# Patient Record
Sex: FEMALE | Race: OTHER | NOT HISPANIC OR LATINO
[De-identification: names, ages, dates, MRNs, and addresses within clinical notes are randomized per-mention and may not be internally consistent; named-entity substitution may affect disease eponyms.]

---

## 2019-06-26 ENCOUNTER — APPOINTMENT (OUTPATIENT)
Dept: ENDOCRINOLOGY | Facility: CLINIC | Age: 19
End: 2019-06-26
Payer: COMMERCIAL

## 2019-06-26 VITALS
HEIGHT: 65 IN | SYSTOLIC BLOOD PRESSURE: 107 MMHG | WEIGHT: 148 LBS | DIASTOLIC BLOOD PRESSURE: 67 MMHG | HEART RATE: 57 BPM | BODY MASS INDEX: 24.66 KG/M2

## 2019-06-26 PROBLEM — Z00.00 ENCOUNTER FOR PREVENTIVE HEALTH EXAMINATION: Status: ACTIVE | Noted: 2019-06-26

## 2019-06-26 PROCEDURE — 99205 OFFICE O/P NEW HI 60 MIN: CPT

## 2019-06-28 NOTE — ADDENDUM
[FreeTextEntry1] : I, Alonzodevaughn Cedeño, acted solely as a scribe for Dr. Scar Pinto on this date. 06/26/2019.\par

## 2019-06-28 NOTE — HISTORY OF PRESENT ILLNESS
[FreeTextEntry1] : 20 y/o F pt, /67, BMI 24.63, with Hx thyroid nodule (dx in ~2018), referred by PCP: Dr. Jaylyn Severino, presents today to establish endocrine care with me.\par No FHx of thyroid disease or DM.\par Pt denies hx of childhood neck/head radiation exposure.\par SHx: former smoker (quit on 6/23/19)\par LNMP: 6/1/19, pt has not plans for pregnancies.\par \par 5/22/19 s. creat 0.7, TSH 1.1, total T4 6.4, \par 5/22/19 Thyroid US: L lobe solid heterogeneous nodule at the left mid and lower pole with punctate calcification measuring 3.7 x 1.7                                   x 2.6cm. \par                                  Lymph node: L cervical lymph node 1.3 x 1.5 x 1.7 cm\par                                  R cervical lymph node 1.8 x 1.3 x 1.0cm\par 6/25/19 FNA Biopsy at [facility]: [place] VI, papillary thyroid CA\par \par Pt says in ~2018 she noticed thyroid enlargement and went to see her doctor, who recommended a thyroid biopsy (done on 6/25/19). Pt states the biopsy reported she had papillary thyroid CA.\par Today pt presents feeling well, with no major physical complaints.

## 2019-06-28 NOTE — ASSESSMENT
[FreeTextEntry1] : 1. Self diagnosed thyroid nodules ~2 yrs ago, with no childhood Hx of head/neck radiation exposure and no FHx of thyroid CA:\par 5/22/19, thyroid weakness: Right low normal\par Left lobe a3.7 x 1.7 x 2.6 cm solid mass with calcification\par FNA biopsy done in 5/2019 reports [place] VI. Interpretation of FNA biopsy, along with the referral for ENT evaluation and treatment explained to pt.  \par \par Return in 6 weeks.

## 2019-06-28 NOTE — PHYSICAL EXAM
[Alert] : alert [Normal Sclera/Conjunctiva] : normal sclera/conjunctiva [Normal Outer Ear/Nose] : the ears and nose were normal in appearance [No Respiratory Distress] : no respiratory distress [Clear to Auscultation] : lungs were clear to auscultation bilaterally [Normal Rate] : heart rate was normal  [Normal S1, S2] : normal S1 and S2 [Normal Bowel Sounds] : normal bowel sounds [Spine Straight] : spine straight [Normal Gait] : normal gait [Oriented x3] : oriented to person, place, and time [de-identified] : L thyroid mass indurated CC ~4cm lengthwise and ~2.5cm widthwise, nodularities in the R lobe [de-identified] : no hand tremors

## 2019-06-28 NOTE — END OF VISIT
[>50% of Time Spent on Counseling for ____] : Greater than 50% of the encounter time was spent on counseling for [unfilled] [FreeTextEntry3] : All medical record entries made by the Scribe were at my, Dr. Scar Pinto, direction and personally dictated by me on 06/26/2019. I have reviewed the chart and agree that the record accurately reflects my personal performance of the history, physical exam, assessment and plan. I have also personally directed, reviewed and agreed with the chart.\par  [Time Spent: ___ minutes] : I have spent [unfilled] minutes of face to face time with the patient

## 2019-06-28 NOTE — REASON FOR VISIT
[Initial Eval - Existing Diagnosis] : an initial evaluation of an existing diagnosis [FreeTextEntry1] : thyroid nodule

## 2019-07-08 ENCOUNTER — APPOINTMENT (OUTPATIENT)
Dept: OTOLARYNGOLOGY | Facility: CLINIC | Age: 19
End: 2019-07-08

## 2019-08-07 ENCOUNTER — APPOINTMENT (OUTPATIENT)
Dept: ENDOCRINOLOGY | Facility: CLINIC | Age: 19
End: 2019-08-07

## 2019-09-17 ENCOUNTER — APPOINTMENT (OUTPATIENT)
Dept: OTOLARYNGOLOGY | Facility: CLINIC | Age: 19
End: 2019-09-17
Payer: MEDICAID

## 2019-09-17 VITALS
SYSTOLIC BLOOD PRESSURE: 115 MMHG | TEMPERATURE: 98.3 F | BODY MASS INDEX: 24.66 KG/M2 | HEIGHT: 65 IN | HEART RATE: 58 BPM | WEIGHT: 148 LBS | OXYGEN SATURATION: 98 % | DIASTOLIC BLOOD PRESSURE: 76 MMHG

## 2019-09-17 DIAGNOSIS — Z78.9 OTHER SPECIFIED HEALTH STATUS: ICD-10-CM

## 2019-09-17 DIAGNOSIS — Z80.9 FAMILY HISTORY OF MALIGNANT NEOPLASM, UNSPECIFIED: ICD-10-CM

## 2019-09-17 PROCEDURE — 99204 OFFICE O/P NEW MOD 45 MIN: CPT | Mod: 25

## 2019-09-17 PROCEDURE — 31575 DIAGNOSTIC LARYNGOSCOPY: CPT

## 2019-09-17 PROCEDURE — 99203 OFFICE O/P NEW LOW 30 MIN: CPT

## 2019-09-17 NOTE — ASSESSMENT
[FreeTextEntry1] : Discussed a thyroidectomy & she would like to proceed. Will perform a tonsil biopsy first; RTC tomorrow for procedure appt.

## 2019-09-17 NOTE — HISTORY OF PRESENT ILLNESS
[de-identified] : 2 years ago found a L thyroid nodule which eventually resulted in a sono revealing a 3.7 cm; an FNA 7/19 BC VI for PTC. Has had some change in the tone of her voice since 4/19 but no outright breathiness. She denies any dysphagia or other related complaints. Denies a FHx of thyroid issues or ionizing radiation exp. \par Secondly noticed that her L tonsil is larger than her R earlier this year; not sure if this is progressive.

## 2019-09-17 NOTE — PHYSICAL EXAM
[de-identified] : large, firm L thyroid nodule [de-identified] : 3+ R tonsil, 1+ L tonsil [Laryngoscopy Performed] : laryngoscopy was performed, see procedure section for findings [Normal] : orientation to person, place, and time: normal

## 2019-09-17 NOTE — CONSULT LETTER
[Dear  ___] : Dear  [unfilled], [Consult Letter:] : I had the pleasure of evaluating your patient, [unfilled]. [Consult Closing:] : Thank you very much for allowing me to participate in the care of this patient.  If you have any questions, please do not hesitate to contact me. [Please see my note below.] : Please see my note below. [Sincerely,] : Sincerely, [DrBethany  ___] : Dr. FONG [FreeTextEntry3] : WILNER Lopez Jr, MD, FAAOHNS\par Otolaryngologist\par [facility]

## 2019-09-17 NOTE — DATA REVIEWED
[de-identified] : FNA 6/19: BC VI, PTC [de-identified] : 5/19: 3.7 x 2.1 x 2.6 cm L nodule w/ microCa'ns; L cervical node measuring 1.3 x 0.5 x 0.7 cm (no level given) & R cervical node 1.8 x 0.3 x 1.0 cm (no level given)

## 2019-09-18 ENCOUNTER — APPOINTMENT (OUTPATIENT)
Dept: OTOLARYNGOLOGY | Facility: CLINIC | Age: 19
End: 2019-09-18
Payer: MEDICAID

## 2019-09-18 ENCOUNTER — LABORATORY RESULT (OUTPATIENT)
Age: 19
End: 2019-09-18

## 2019-09-18 VITALS
BODY MASS INDEX: 24.66 KG/M2 | HEART RATE: 57 BPM | TEMPERATURE: 98.6 F | HEIGHT: 65 IN | SYSTOLIC BLOOD PRESSURE: 129 MMHG | OXYGEN SATURATION: 100 % | WEIGHT: 148 LBS | DIASTOLIC BLOOD PRESSURE: 64 MMHG

## 2019-09-18 PROCEDURE — 42800 BIOPSY OF THROAT: CPT

## 2019-09-18 RX ORDER — LORATADINE 10 MG/1
10 TABLET ORAL
Qty: 30 | Refills: 0 | Status: DISCONTINUED | COMMUNITY
Start: 2019-09-16

## 2019-09-18 RX ORDER — DESOGESTREL AND ETHINYL ESTRADIOL 0.15-0.03
0.15-3 KIT ORAL
Qty: 28 | Refills: 0 | Status: DISCONTINUED | COMMUNITY
Start: 2019-09-16

## 2019-09-19 NOTE — PROCEDURE
[FreeTextEntry1] : L tonsil biopsy [FreeTextEntry2] : Prominent asymmetry & mildly suspicious appearance [FreeTextEntry3] : After proper informed consent and a time-out, intraoral topical anesthesia was applied.  A small amount of 1% lidocaine with 1:100,000 epinephrine was injected into the tonsil.  Two biopsies were taken with a thru-cut forcep and sent for pathologic analysis.  The patient tolerated the procedure well.

## 2019-10-03 ENCOUNTER — APPOINTMENT (OUTPATIENT)
Dept: OTOLARYNGOLOGY | Facility: CLINIC | Age: 19
End: 2019-10-03
Payer: MEDICAID

## 2019-10-03 VITALS
OXYGEN SATURATION: 99 % | WEIGHT: 148 LBS | HEIGHT: 65 IN | TEMPERATURE: 98.1 F | DIASTOLIC BLOOD PRESSURE: 72 MMHG | SYSTOLIC BLOOD PRESSURE: 121 MMHG | HEART RATE: 74 BPM | BODY MASS INDEX: 24.66 KG/M2

## 2019-10-03 PROCEDURE — 99214 OFFICE O/P EST MOD 30 MIN: CPT

## 2019-10-03 NOTE — DATA REVIEWED
[de-identified] : FNA 6/19: BC VI, PTC\par L tonsil bx: inflammation [de-identified] : 5/19: 3.7 x 2.1 x 2.6 cm L nodule w/ microCa'ns; L cervical node measuring 1.3 x 0.5 x 0.7 cm (no level given) & R cervical node 1.8 x 0.3 x 1.0 cm (no level given)

## 2019-10-03 NOTE — PHYSICAL EXAM
[de-identified] : large, firm L thyroid nodule [de-identified] : 3+ R tonsil, 1+ L tonsil [Normal] : no rashes

## 2019-10-03 NOTE — HISTORY OF PRESENT ILLNESS
[de-identified] : 2 years ago found a L thyroid nodule which eventually resulted in a sono revealing a 3.7 cm; an FNA 7/19 BC VI for PTC. Has had some change in the tone of her voice since 4/19 but no outright breathiness. She denies any dysphagia or other related complaints. Denies a FHx of thyroid issues or ionizing radiation exp. \par Secondly noticed that her L tonsil is larger than her R earlier this year; not sure if this is progressive. Now f/u a bx.

## 2019-10-10 ENCOUNTER — FORM ENCOUNTER (OUTPATIENT)
Age: 19
End: 2019-10-10

## 2019-10-11 ENCOUNTER — APPOINTMENT (OUTPATIENT)
Dept: ULTRASOUND IMAGING | Facility: CLINIC | Age: 19
End: 2019-10-11
Payer: MEDICAID

## 2019-10-11 ENCOUNTER — OUTPATIENT (OUTPATIENT)
Dept: OUTPATIENT SERVICES | Facility: HOSPITAL | Age: 19
LOS: 1 days | End: 2019-10-11

## 2019-10-11 PROCEDURE — 76536 US EXAM OF HEAD AND NECK: CPT | Mod: 26

## 2019-10-21 ENCOUNTER — APPOINTMENT (OUTPATIENT)
Dept: OTOLARYNGOLOGY | Facility: CLINIC | Age: 19
End: 2019-10-21
Payer: MEDICAID

## 2019-10-21 VITALS
OXYGEN SATURATION: 96 % | BODY MASS INDEX: 24.66 KG/M2 | HEART RATE: 72 BPM | DIASTOLIC BLOOD PRESSURE: 73 MMHG | HEIGHT: 65 IN | WEIGHT: 148 LBS | TEMPERATURE: 98.2 F | SYSTOLIC BLOOD PRESSURE: 116 MMHG

## 2019-10-21 DIAGNOSIS — J35.8 OTHER CHRONIC DISEASES OF TONSILS AND ADENOIDS: ICD-10-CM

## 2019-10-21 PROCEDURE — 99215 OFFICE O/P EST HI 40 MIN: CPT

## 2019-10-21 RX ORDER — BACITRACIN 500 [IU]/G
500 OINTMENT TOPICAL
Qty: 1 | Refills: 1 | Status: ACTIVE | COMMUNITY
Start: 2019-10-21 | End: 1900-01-01

## 2019-10-21 RX ORDER — PHENOL 1.4 %
600 AEROSOL, SPRAY (ML) MUCOUS MEMBRANE
Qty: 90 | Refills: 2 | Status: ACTIVE | COMMUNITY
Start: 2019-10-21 | End: 1900-01-01

## 2019-10-21 RX ORDER — HYDROCODONE BITARTRATE AND ACETAMINOPHEN 5; 325 MG/1; MG/1
5-325 TABLET ORAL
Qty: 25 | Refills: 0 | Status: ACTIVE | COMMUNITY
Start: 2019-10-21 | End: 1900-01-01

## 2019-10-21 NOTE — HISTORY OF PRESENT ILLNESS
[de-identified] : 2 years ago found a L thyroid nodule which eventually resulted in a sono revealing a 3.7 cm; an FNA 7/19 BC VI for PTC. Has had some change in the tone of her voice since 4/19 but no outright breathiness. She denies any dysphagia or other related complaints. Denies a FHx of thyroid issues or ionizing radiation exp. Now preop thyroidectomy\par Secondly noticed that her L tonsil is larger than her R earlier this year; not sure if this is progressive, but a bx was negative.

## 2019-10-21 NOTE — DATA REVIEWED
[de-identified] : 5/19: 3.7 x 2.1 x 2.6 cm L nodule w/ microCa'ns; L cervical node measuring 1.3 x 0.5 x 0.7 cm (no level given) & R cervical node 1.8 x 0.3 x 1.0 cm (no level given)\par 10/19 rpt sono: 3.7 cm nodule ess unchanged; nl sized level 2 JOEY nodes [de-identified] : FNA 6/19: BC VI, PTC\par L tonsil bx: inflammation

## 2019-10-21 NOTE — ASSESSMENT
[FreeTextEntry1] : Per the 2015 ASAEL guidelines, discussed the advantages & disadvantages of a hemithyroidectomy for well-diff ca smaller than 4cm, but she strongly desires to have the whole gland removed. \par Discussed the risks and benefits of thyroidectomy and a level VI neck dissection at length, including but not limited to poor scarring, hypoparathyroidism, hoarseness, the need for more surgery, and bleeding or infection. The patient expressed understanding and desired to proceed. An educational perioperative care handout was given.\par  Reference #: [id number]

## 2019-10-21 NOTE — PHYSICAL EXAM
[Normal] : auscultation of heart is normal [de-identified] : large, firm L thyroid nodule, unchanged [de-identified] : 3+ R tonsil, 1+ L tonsil, unchanged

## 2019-10-25 ENCOUNTER — RESULT REVIEW (OUTPATIENT)
Age: 19
End: 2019-10-25

## 2019-10-25 ENCOUNTER — OUTPATIENT (OUTPATIENT)
Dept: INPATIENT UNIT | Facility: HOSPITAL | Age: 19
LOS: 1 days | Discharge: HOME | End: 2019-10-25
Payer: MEDICAID

## 2019-10-25 ENCOUNTER — APPOINTMENT (OUTPATIENT)
Dept: OTOLARYNGOLOGY | Facility: HOSPITAL | Age: 19
End: 2019-10-25

## 2019-10-25 VITALS
HEIGHT: 65 IN | DIASTOLIC BLOOD PRESSURE: 70 MMHG | RESPIRATION RATE: 16 BRPM | SYSTOLIC BLOOD PRESSURE: 107 MMHG | TEMPERATURE: 98 F | HEART RATE: 62 BPM | OXYGEN SATURATION: 100 % | WEIGHT: 145.06 LBS

## 2019-10-25 LAB
CALCIUM SERPL-MCNC: 9.2 MG/DL — SIGNIFICANT CHANGE UP (ref 8.4–10.5)
PTH-INTACT IO % DIF SERPL: 36 PG/ML — SIGNIFICANT CHANGE UP (ref 8.5–72.5)

## 2019-10-25 PROCEDURE — 60252 REMOVAL OF THYROID: CPT | Mod: GC

## 2019-10-25 RX ORDER — LEVOTHYROXINE SODIUM 125 MCG
1 TABLET ORAL
Qty: 30 | Refills: 1
Start: 2019-10-25 | End: 2019-12-23

## 2019-10-25 RX ORDER — HYDROMORPHONE HYDROCHLORIDE 2 MG/ML
0.5 INJECTION INTRAMUSCULAR; INTRAVENOUS; SUBCUTANEOUS
Refills: 0 | Status: DISCONTINUED | OUTPATIENT
Start: 2019-10-25 | End: 2019-10-26

## 2019-10-25 RX ORDER — CALCIUM CARBONATE 500(1250)
1 TABLET ORAL
Qty: 90 | Refills: 0
Start: 2019-10-25 | End: 2019-11-23

## 2019-10-25 RX ORDER — CALCIUM CARBONATE 500(1250)
2 TABLET ORAL EVERY 8 HOURS
Refills: 0 | Status: DISCONTINUED | OUTPATIENT
Start: 2019-10-25 | End: 2019-10-26

## 2019-10-25 RX ORDER — SODIUM CHLORIDE 9 MG/ML
1000 INJECTION, SOLUTION INTRAVENOUS
Refills: 0 | Status: DISCONTINUED | OUTPATIENT
Start: 2019-10-25 | End: 2019-10-26

## 2019-10-25 RX ORDER — BENZOCAINE AND MENTHOL 5; 1 G/100ML; G/100ML
1 LIQUID ORAL EVERY 4 HOURS
Refills: 0 | Status: DISCONTINUED | OUTPATIENT
Start: 2019-10-25 | End: 2019-10-26

## 2019-10-25 RX ORDER — OXYCODONE AND ACETAMINOPHEN 5; 325 MG/1; MG/1
1 TABLET ORAL EVERY 4 HOURS
Refills: 0 | Status: DISCONTINUED | OUTPATIENT
Start: 2019-10-25 | End: 2019-10-26

## 2019-10-25 RX ORDER — ACETAMINOPHEN 500 MG
650 TABLET ORAL EVERY 6 HOURS
Refills: 0 | Status: DISCONTINUED | OUTPATIENT
Start: 2019-10-25 | End: 2019-10-26

## 2019-10-25 RX ORDER — ACETAMINOPHEN 500 MG
650 TABLET ORAL EVERY 6 HOURS
Refills: 0 | Status: DISCONTINUED | OUTPATIENT
Start: 2019-10-25 | End: 2019-10-25

## 2019-10-25 RX ORDER — LEVOTHYROXINE SODIUM 125 MCG
100 TABLET ORAL DAILY
Refills: 0 | Status: DISCONTINUED | OUTPATIENT
Start: 2019-10-25 | End: 2019-10-26

## 2019-10-25 RX ORDER — ONDANSETRON 8 MG/1
4 TABLET, FILM COATED ORAL EVERY 8 HOURS
Refills: 0 | Status: DISCONTINUED | OUTPATIENT
Start: 2019-10-25 | End: 2019-10-26

## 2019-10-25 RX ADMIN — SODIUM CHLORIDE 75 MILLILITER(S): 9 INJECTION, SOLUTION INTRAVENOUS at 21:09

## 2019-10-25 RX ADMIN — Medication 650 MILLIGRAM(S): at 19:51

## 2019-10-25 RX ADMIN — Medication 650 MILLIGRAM(S): at 21:00

## 2019-10-25 RX ADMIN — SODIUM CHLORIDE 75 MILLILITER(S): 9 INJECTION, SOLUTION INTRAVENOUS at 22:07

## 2019-10-25 RX ADMIN — Medication 2 TABLET(S): at 22:06

## 2019-10-25 NOTE — BRIEF OPERATIVE NOTE - OPERATION/FINDINGS
large left thyroid mass approaching midline (~4cm), no gross hipolito disease, RLN identified and preserved bilaterally

## 2019-10-26 ENCOUNTER — TRANSCRIPTION ENCOUNTER (OUTPATIENT)
Age: 19
End: 2019-10-26

## 2019-10-26 VITALS
OXYGEN SATURATION: 100 % | SYSTOLIC BLOOD PRESSURE: 116 MMHG | RESPIRATION RATE: 17 BRPM | DIASTOLIC BLOOD PRESSURE: 71 MMHG | TEMPERATURE: 98 F | HEART RATE: 51 BPM

## 2019-10-26 PROCEDURE — 86901 BLOOD TYPING SEROLOGIC RH(D): CPT

## 2019-10-26 PROCEDURE — 82310 ASSAY OF CALCIUM: CPT

## 2019-10-26 PROCEDURE — 60252 REMOVAL OF THYROID: CPT

## 2019-10-26 PROCEDURE — 83970 ASSAY OF PARATHORMONE: CPT

## 2019-10-26 PROCEDURE — 86850 RBC ANTIBODY SCREEN: CPT

## 2019-10-26 PROCEDURE — 88305 TISSUE EXAM BY PATHOLOGIST: CPT

## 2019-10-26 PROCEDURE — 88307 TISSUE EXAM BY PATHOLOGIST: CPT

## 2019-10-26 PROCEDURE — 86900 BLOOD TYPING SEROLOGIC ABO: CPT

## 2019-10-26 RX ORDER — ACETAMINOPHEN 500 MG
2 TABLET ORAL
Qty: 0 | Refills: 0 | DISCHARGE
Start: 2019-10-26

## 2019-10-26 RX ADMIN — Medication 2 TABLET(S): at 06:03

## 2019-10-26 RX ADMIN — Medication 100 MICROGRAM(S): at 06:03

## 2019-10-26 RX ADMIN — OXYCODONE AND ACETAMINOPHEN 1 TABLET(S): 5; 325 TABLET ORAL at 08:39

## 2019-10-26 RX ADMIN — OXYCODONE AND ACETAMINOPHEN 1 TABLET(S): 5; 325 TABLET ORAL at 09:32

## 2019-10-26 RX ADMIN — Medication 650 MILLIGRAM(S): at 06:04

## 2019-10-26 NOTE — DISCHARGE NOTE PROVIDER - HOSPITAL COURSE
19F s/p completion thyroidectomy for PTC. Recovering well from surgery. Voice strong and clear. Tolerating PO diet. Stable for DC home with outpatient followup.

## 2019-10-26 NOTE — DISCHARGE NOTE PROVIDER - CARE PROVIDER_API CALL
Fredi Lopez)  Otolaryngology  [address] [facility], [address] [place] [address]  Phone: [phone]  Fax: [phone]  Follow Up Time:

## 2019-10-26 NOTE — DISCHARGE NOTE NURSING/CASE MANAGEMENT/SOCIAL WORK - PATIENT PORTAL LINK FT
You can access the FollowMyHealth Patient Portal offered by [facility] by registering at the following website: [email or web address]. By joining [employer]’s FollowMyHealth portal, you will also be able to view your health information using other applications (apps) compatible with our system.

## 2019-10-26 NOTE — DISCHARGE NOTE PROVIDER - NSDCFUADDINST_GEN_ALL_CORE_FT
Keep incision clean and dry. OK to shower, do not scrub neck. Pat neck dry.   No heavy lifting (>5lbs), no straining, no running/exercise or strenuous activity.   Call the office or return to the ED for any bleeding, swelling in the neck , discharge from wound, difficulty breathing, numbess/tingling around mouth or fingers.

## 2019-10-26 NOTE — DISCHARGE NOTE PROVIDER - CARE PROVIDERS DIRECT ADDRESSES
[email or web address]

## 2019-10-26 NOTE — DISCHARGE NOTE PROVIDER - NSDCACTIVITY_GEN_ALL_CORE
Do not drive or operate machinery/No heavy lifting/straining/Stairs allowed/Do not make important decisions/Walking - Indoors allowed/Walking - Outdoors allowed

## 2019-10-26 NOTE — DISCHARGE NOTE PROVIDER - NSDCCPCAREPLAN_GEN_ALL_CORE_FT
PRINCIPAL DISCHARGE DIAGNOSIS  Diagnosis: Papillary thyroid carcinoma  Assessment and Plan of Treatment:

## 2019-10-28 LAB — SURGICAL PATHOLOGY STUDY: SIGNIFICANT CHANGE UP

## 2019-10-31 ENCOUNTER — APPOINTMENT (OUTPATIENT)
Dept: OTOLARYNGOLOGY | Facility: CLINIC | Age: 19
End: 2019-10-31
Payer: MEDICAID

## 2019-10-31 VITALS
SYSTOLIC BLOOD PRESSURE: 148 MMHG | BODY MASS INDEX: 24.66 KG/M2 | OXYGEN SATURATION: 97 % | HEIGHT: 65 IN | HEART RATE: 111 BPM | WEIGHT: 148 LBS | DIASTOLIC BLOOD PRESSURE: 92 MMHG

## 2019-10-31 PROBLEM — C73 MALIGNANT NEOPLASM OF THYROID GLAND: Chronic | Status: ACTIVE | Noted: 2019-10-24

## 2019-10-31 PROCEDURE — 99024 POSTOP FOLLOW-UP VISIT: CPT

## 2019-10-31 NOTE — PHYSICAL EXAM
[Normal] : normal appearance, well groomed, well nourished, and in no acute distress [FreeTextEntry1] : strong voice [de-identified] : well-healing low collar incision

## 2019-10-31 NOTE — ASSESSMENT
[FreeTextEntry1] : Doing well; further care discussed. RTC 1 month & f/u w/ Dr. Pinto to consider WILLIAM. Plan to present at endo conf.

## 2019-10-31 NOTE — HISTORY OF PRESENT ILLNESS
[de-identified] : s/p thyroidectomy & level VI ND 10/25/19; no complaints and doing well. \par path: 3.6 cm PTC, 20% tall cell; confined to gland. 2/3 LNs w/ mets, no extranodal extension

## 2019-11-07 ENCOUNTER — OTHER (OUTPATIENT)
Age: 19
End: 2019-11-07

## 2019-11-25 ENCOUNTER — APPOINTMENT (OUTPATIENT)
Dept: OTOLARYNGOLOGY | Facility: CLINIC | Age: 19
End: 2019-11-25

## 2019-11-27 ENCOUNTER — APPOINTMENT (OUTPATIENT)
Dept: ENDOCRINOLOGY | Facility: CLINIC | Age: 19
End: 2019-11-27
Payer: MEDICAID

## 2019-11-27 ENCOUNTER — LABORATORY RESULT (OUTPATIENT)
Age: 19
End: 2019-11-27

## 2019-11-27 VITALS
HEIGHT: 65 IN | BODY MASS INDEX: 24.83 KG/M2 | HEART RATE: 72 BPM | DIASTOLIC BLOOD PRESSURE: 56 MMHG | SYSTOLIC BLOOD PRESSURE: 99 MMHG | WEIGHT: 149 LBS

## 2019-11-27 PROCEDURE — 99214 OFFICE O/P EST MOD 30 MIN: CPT

## 2019-11-27 NOTE — ASSESSMENT
[FreeTextEntry1] : 20 y/o F with:\par \par 1.  s/p thyroidectomy 2/2 papillary thyroid CA, 3.6 cm, involving left lobe, classic type with tall cell, features (~20%) confined to the thyroid gland.\par No information found on current medication of the pt. Pharmacy was contacted and no rx for Calcitriol and levothyroxine was found.  Had an overview on thyroid papillary CA with pt, and discussed plans for treatments. It is unlikely that she will need WILLIAM ablation.\par Pt will call with her current medications list.\par Ordered TFTs for thyroid and thyroglobulin ab today.\par \par Return in 2 weeks.

## 2019-11-27 NOTE — PHYSICAL EXAM
[Alert] : alert [Normal Sclera/Conjunctiva] : normal sclera/conjunctiva [Normal Outer Ear/Nose] : the ears and nose were normal in appearance [No Respiratory Distress] : no respiratory distress [Clear to Auscultation] : lungs were clear to auscultation bilaterally [Normal Rate] : heart rate was normal  [Normal S1, S2] : normal S1 and S2 [Normal Bowel Sounds] : normal bowel sounds [Spine Straight] : spine straight [Normal Gait] : normal gait [Oriented x3] : oriented to person, place, and time [Thyroid Not Enlarged] : the thyroid was not enlarged [No Thyroid Nodules] : there were no palpable thyroid nodules [No Edema] : there was no peripheral edema [de-identified] : thyroid not palpated, no mass [de-identified] : no hand tremors

## 2019-11-27 NOTE — HISTORY OF PRESENT ILLNESS
[FreeTextEntry1] : 5/22/19 s. creat 0.7, TSH 1.1, total T4 6.4, \par 5/22/19 Thyroid US: L lobe solid heterogeneous nodule at the left mid and lower pole with punctate calcification measuring 3.7 x 1.7                                   x 2.6cm. \par                                  Lymph node: L cervical lymph node 1.3 x 1.5 x 1.7 cm\par                                  R cervical lymph node 1.8 x 1.3 x 1.0cm\par 6/25/19 FNA Biopsy at [facility]: [place] VI, papillary thyroid CA\par 10/4/19: Prothrombin time 10.7, INR 0.94, TSH 1.32, s. creat 0.91, \par 10/11/19 Thyroid US: R lobe contains no nodules. L lobe contains one nodule, Mid to lower pole 3.7 x 2.1 x 2.3 cm solid hypoechoic with microcalcifications. Isthmus no nodules\par 10/25/19 Pathology: Thyroid with papillary thyroid carcinoma 3.6cm, involving  L lobe with tall cell features.\par \par 18 y/o F pt, BP 99/56, BMI 24.79, with Hx thyroid nodule (dx in ~2018),\par No FHx of thyroid disease or DM.\par Pt denies hx of childhood neck/head radiation exposure.\par SHx: former smoker (quit on 6/23/19)\par LNMP: 6/1/19, pt has not plans for pregnancies.\par \par 6/26/19\par Pt says in ~2018 she noticed thyroid enlargement and went to see her doctor, who recommended a thyroid biopsy (done on 6/25/19). Pt states the biopsy reported she had papillary thyroid CA.\par Today pt presents feeling well, with no major physical complaints. \par \par 11/27/2019 \par Pt presents today for post op thyroid surgery (10/25/19) f/u, feeling better with no major physical complaints. \par She reports feeling energized. She notes some hoarseness in her voice but no significant voice changes.\par Denies swallowing difficulties, breathing problems, weight loss, palpitations, pins and needle sensations in the lower extremities  and diarrhea.\par \par Current Medication: Bacitracin, Calcium Carbonate 600mg, Hydrocodone-Acetaminophen, levothyroxine QD, Calcitriol TID

## 2019-11-27 NOTE — END OF VISIT
[FreeTextEntry3] : All medical record entries made by the scribe were at my, Dr. Scar Pinto, direction and personally dictated by me on 11/27/2019 I have reviewed the chart and agree that the record accurately reflects my personal performance of the history, physical exam, assessment and plan. I have also personally directed, reviewed and agreed with the chart.  [>50% of Time Spent on Counseling for ____] : Greater than 50% of the encounter time was spent on counseling for [unfilled] [Time Spent: ___ minutes] : I have spent [unfilled] minutes of face to face time with the patient

## 2019-11-27 NOTE — ADDENDUM
[FreeTextEntry1] : I, Lorenzo Franz, acted solely as a scribe for Dr. Scar Pinto on this date. 11/27/2019.

## 2019-11-27 NOTE — REVIEW OF SYSTEMS
[Negative] : Endocrine [As Noted in HPI] : as noted in HPI [Recent Weight Loss (___ Lbs)] : no recent weight loss [FreeTextEntry4] : denies voice changes [FreeTextEntry9] : denies pins and needles sensation in lower extremities

## 2019-12-06 ENCOUNTER — APPOINTMENT (OUTPATIENT)
Dept: ENDOCRINOLOGY | Facility: CLINIC | Age: 19
End: 2019-12-06
Payer: MEDICAID

## 2019-12-06 VITALS — WEIGHT: 148 LBS | DIASTOLIC BLOOD PRESSURE: 72 MMHG | HEART RATE: 75 BPM | SYSTOLIC BLOOD PRESSURE: 136 MMHG

## 2019-12-06 PROCEDURE — 99214 OFFICE O/P EST MOD 30 MIN: CPT

## 2019-12-06 NOTE — ASSESSMENT
[FreeTextEntry1] : 18 y/o F with:\par \par 1.  s/p thyroidectomy 2/2 papillary thyroid CA, with thyroglobulin 0.61 and Free T4 1.0,\par Pt is doing well with no physical complaints. Patient's thyroid ca is at low risk for recurrence. \par Pt opted for medical management, no WILLIAM ablation at this point.\par Will monitor with thyroglobulin and thyroid US in 2/2020. Increase levothyroxine to 125mcg with a target of high t4 free, and  suppressed TSH\par Order TFTs and thyroid US for 2/2020\par f/u end of 2/2020 [Levothyroxine] : The patient was instructed to take Levothyroxine on an empty stomach, separate from vitamins, and wait at least 30 minutes before eating

## 2019-12-06 NOTE — HISTORY OF PRESENT ILLNESS
[FreeTextEntry1] : 5/22/19 s. creat 0.7, TSH 1.1, total T4 6.4, \par 5/22/19 Thyroid US: L lobe solid heterogeneous nodule at the left mid and lower pole with punctate calcification measuring 3.7 x 1.7                                   x 2.6cm. \par                                  Lymph node: L cervical lymph node 1.3 x 1.5 x 1.7 cm\par                                  R cervical lymph node 1.8 x 1.3 x 1.0cm\par 6/25/19 FNA Biopsy at [facility]: [place] VI, papillary thyroid CA\par 10/4/19: Prothrombin time 10.7, INR 0.94, TSH 1.32, s. creat 0.91, \par 10/11/19 Thyroid US: R lobe contains no nodules. L lobe contains one nodule, Mid to lower pole 3.7 x 2.1 x 2.3 cm solid hypoechoic with microcalcifications. Isthmus no nodules\par 10/25/19 Pathology: Thyroid with papillary thyroid carcinoma 3.6cm, involving  L lobe with tall cell features.\par 11/27/19: thyroglobulin 0.61, thyroglobulin ab <20.0, Vit D 1, 25 OH 27.6, glucose 63, s. creat 0.72, Free T4 1.0, Ca 9.1, iPTH 48, TPO ab 15.3\par \par 18 y/o F pt, with Hx thyroid nodule (dx in ~2018), s/p thyroidectomy (10/25/19) for papillary thyroid CA\par No FHx of thyroid disease or DM.\par Pt denies hx of childhood neck/head radiation exposure.\par SHx: former smoker (quit on 6/23/19)\par LNMP: 6/1/19, pt has not plans for pregnancies.\par \par 6/26/19\par Pt says in ~2018 she noticed thyroid enlargement and went to see her doctor, who recommended a thyroid biopsy (done on 6/25/19). Pt states the biopsy reported she had papillary thyroid CA.\par Today pt presents feeling well, with no major physical complaints. \par \par 11/27/2019 \par Pt presents today for post op thyroid surgery (10/25/19) f/u, feeling better with no major physical complaints. \par She reports feeling energized. She notes some hoarseness in her voice but no significant voice changes.\par Denies swallowing difficulties, breathing problems, weight loss, palpitations, pins and needle sensations in the lower extremities  and diarrhea.\par \par 12/06/2019 \par Pt with a hx of thyroidectomy for papillary thyroid CA, presents today for a thyroid f/u, feeling well with no physical complaints. She denies neck pain, palpitation, weight loss, and swallowing difficulty. Reports hoarseness is no longer present with improvement to voice changes. Pt has been off Oysco for more than 2 weeks.\par \par Current Medication: Bacitracin, Calcium Carbonate 600 mg, Hydrocodone-Acetaminophen, levothyroxine 100mcg QD.

## 2019-12-06 NOTE — PHYSICAL EXAM
[Alert] : alert [Normal Sclera/Conjunctiva] : normal sclera/conjunctiva [No Thyroid Nodules] : there were no palpable thyroid nodules [Thyroid Not Enlarged] : the thyroid was not enlarged [Normal Outer Ear/Nose] : the ears and nose were normal in appearance [Normal Rate] : heart rate was normal  [Clear to Auscultation] : lungs were clear to auscultation bilaterally [No Respiratory Distress] : no respiratory distress [Normal Bowel Sounds] : normal bowel sounds [No Edema] : there was no peripheral edema [Normal S1, S2] : normal S1 and S2 [Normal Gait] : normal gait [Spine Straight] : spine straight [Oriented x3] : oriented to person, place, and time [de-identified] : thyroid not palpated, no mass [de-identified] : no hand tremors

## 2019-12-06 NOTE — REVIEW OF SYSTEMS
[As Noted in HPI] : as noted in HPI [Negative] : Neurological [Dysphagia] : no dysphagia [Recent Weight Loss (___ Lbs)] : no recent weight loss [Neck Pain] : no neck pain [FreeTextEntry4] : voice change improved [Palpitations] : no palpitations

## 2019-12-06 NOTE — END OF VISIT
[FreeTextEntry3] : All medical record entries made by the scribe were at my, Dr. Scar Pinto, direction and personally dictated by me on 12/06/2019 I have reviewed the chart and agree that the record accurately reflects my personal performance of the history, physical exam, assessment and plan. I have also personally directed, reviewed and agreed with the chart.  [>50% of Time Spent on Counseling for ____] : Greater than 50% of the encounter time was spent on counseling for [unfilled] [Time Spent: ___ minutes] : I have spent [unfilled] minutes of face to face time with the patient

## 2019-12-06 NOTE — ADDENDUM
[FreeTextEntry1] : I, Lorenzo Franz, acted solely as a scribe for Dr. Scar Pinto on this date. 12/06/2019.

## 2019-12-28 LAB
24R-OH-CALCIDIOL SERPL-MCNC: 27.6 PG/ML
ALBUMIN SERPL ELPH-MCNC: 4.3 G/DL
ALP BLD-CCNC: 66 U/L
ALT SERPL-CCNC: 10 U/L
ANION GAP SERPL CALC-SCNC: 13 MMOL/L
AST SERPL-CCNC: 14 U/L
BILIRUB SERPL-MCNC: 0.2 MG/DL
BUN SERPL-MCNC: 14 MG/DL
CALCIUM SERPL-MCNC: 9.1 MG/DL
CALCIUM SERPL-MCNC: 9.1 MG/DL
CHLORIDE SERPL-SCNC: 103 MMOL/L
CO2 SERPL-SCNC: 25 MMOL/L
CREAT SERPL-MCNC: 0.72 MG/DL
GLUCOSE SERPL-MCNC: 63 MG/DL
PARATHYROID HORMONE INTACT: 48 PG/ML
POTASSIUM SERPL-SCNC: 4.4 MMOL/L
PROT SERPL-MCNC: 7 G/DL
SODIUM SERPL-SCNC: 141 MMOL/L
T4 FREE SERPL-MCNC: 1 NG/DL
THYROGLOB AB SERPL-ACNC: <20 IU/ML
THYROGLOB SERPL-MCNC: 0.61 NG/ML

## 2020-02-03 ENCOUNTER — APPOINTMENT (OUTPATIENT)
Dept: OTOLARYNGOLOGY | Facility: CLINIC | Age: 20
End: 2020-02-03
Payer: MEDICAID

## 2020-02-03 ENCOUNTER — LABORATORY RESULT (OUTPATIENT)
Age: 20
End: 2020-02-03

## 2020-02-03 VITALS
HEART RATE: 60 BPM | BODY MASS INDEX: 24.66 KG/M2 | HEIGHT: 65 IN | SYSTOLIC BLOOD PRESSURE: 123 MMHG | OXYGEN SATURATION: 98 % | WEIGHT: 148 LBS | TEMPERATURE: 98 F | DIASTOLIC BLOOD PRESSURE: 77 MMHG

## 2020-02-03 PROCEDURE — 99214 OFFICE O/P EST MOD 30 MIN: CPT | Mod: 25

## 2020-02-03 PROCEDURE — 11900 INJECT SKIN LESIONS </W 7: CPT

## 2020-02-03 NOTE — HISTORY OF PRESENT ILLNESS
[de-identified] : s/p thyroidectomy & level VI ND 10/25/19; doing well but has noticed that the incision has thickened up a little. She has a hx of a R auricular keloid. \par WILLIAM was not planned so far. \par

## 2020-02-03 NOTE — PROCEDURE
[FreeTextEntry1] : hypertrophic scar injection [FreeTextEntry2] : hypertrophic scar [FreeTextEntry3] : After verbal consent, the scar & surrounding area was cleaned with alcohol. The scar was then injected with ~0.8ml of 40mg/ml Kenalog; this was tolerated well.\par

## 2020-02-03 NOTE — DATA REVIEWED
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 2/3 LNs w/ mets, no extranodal extension \par 11/19: tgb 0.6

## 2020-02-18 LAB
T4 FREE SERPL-MCNC: 1 NG/DL
THYROGLOB AB SERPL-ACNC: <20 IU/ML
THYROGLOB SERPL-MCNC: 0.46 NG/ML
TSH SERPL-ACNC: 15.9 UIU/ML

## 2020-02-19 ENCOUNTER — APPOINTMENT (OUTPATIENT)
Dept: ENDOCRINOLOGY | Facility: CLINIC | Age: 20
End: 2020-02-19
Payer: MEDICAID

## 2020-02-19 VITALS — SYSTOLIC BLOOD PRESSURE: 106 MMHG | DIASTOLIC BLOOD PRESSURE: 60 MMHG | HEART RATE: 53 BPM | WEIGHT: 157 LBS

## 2020-02-19 VITALS — BODY MASS INDEX: 26.13 KG/M2 | HEIGHT: 65 IN

## 2020-02-19 PROCEDURE — 99213 OFFICE O/P EST LOW 20 MIN: CPT

## 2020-02-19 NOTE — ASSESSMENT
[Levothyroxine] : The patient was instructed to take Levothyroxine on an empty stomach, separate from vitamins, and wait at least 30 minutes before eating [FreeTextEntry1] : 18 y/o F with:\par \par 1. s/p thyroidectomy 2/2 papillary thyroid CA, 3.6 cm, involving left lobe, classic type with tall cell, features (~20%) confined to the thyroid gland.\par  Increase levothyroxine to 125 mcg on 12/6/19 however a 2/18/2020 tsh 15.9 with thyroglobulin 0.46. Recommended to increased levothyroxine to 150 mcg. tft in 6 weeks\par TSH target  < 0.4\par Return in 6 months

## 2020-02-19 NOTE — PHYSICAL EXAM
[Alert] : alert [Normal Outer Ear/Nose] : the ears and nose were normal in appearance [Thyroid Not Enlarged] : the thyroid was not enlarged [Well Healed Scar] : well healed scar [No Thyroid Nodules] : there were no palpable thyroid nodules [Clear to Auscultation] : lungs were clear to auscultation bilaterally [Normal Rate] : heart rate was normal  [Normal S1, S2] : normal S1 and S2 [No Edema] : there was no peripheral edema [Normal Bowel Sounds] : normal bowel sounds [Spine Straight] : spine straight [Normal Gait] : normal gait [Oriented x3] : oriented to person, place, and time [de-identified] : thyroid not palpated, no mass [de-identified] : no hand tremors

## 2020-02-19 NOTE — REVIEW OF SYSTEMS
[Recent Weight Loss (___ Lbs)] : no recent weight loss [As Noted in HPI] : as noted in HPI [Dysphagia] : no dysphagia [Neck Pain] : no neck pain [FreeTextEntry4] : voice change improved [Negative] : Endocrine

## 2020-02-19 NOTE — HISTORY OF PRESENT ILLNESS
[FreeTextEntry1] : 5/22/19 s. creat 0.7, TSH 1.1, total T4 6.4\par 2/3/2020, tsh 15.9, t4 free 1.0, thyroglobulin 0.46, thyroglobulin ab: <20.\par 5/22/19 Thyroid US: L lobe solid heterogeneous nodule at the left mid and lower pole with punctate calcification measuring 3.7 x 1.7                                   x 2.6 cm. \par                                  Lymph node: L cervical lymph node 1.3 x 1.5 x 1.7 cm\par                                  R cervical lymph node 1.8 x 1.3 x 1.0 cm\par 6/25/19 FNA Biopsy at [facility]: [place] VI, papillary thyroid CA\par \par 10/25/19, Total thyroidectomy.  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 L lobe contains one nodule, Mid to lower pole 3.7 x 2.1 x 2.3 cm solid hypoechoic with microcalcifications. Isthmus no nodules\par 11/27/19: thyroglobulin 0.61, thyroglobulin ab <20.0, Vit D 1, 25 OH 27.6, glucose 63, s. creat 0.72, Free T4 1.0, Ca 9.1, iPTH 48, TPO ab 15.3\par \par 20 y/o F pt, with Hx thyroid nodule (dx in ~2018), s/p thyroidectomy (10/25/19) for papillary thyroid CA\par No FHx of thyroid disease or DM.\par Pt denies hx of childhood neck/head radiation exposure.\par SHx: former smoker (quit on 6/23/19)\par LNMP: 6/1/19, pt has not plans for pregnancies.\par \par 6/26/19\par Pt says in ~2018 she noticed thyroid enlargement and went to see her doctor, who recommended a thyroid biopsy (done on 6/25/19). Pt states the biopsy reported she had papillary thyroid CA.\par Today pt presents feeling well, with no major physical complaints. \par \par 11/27/2019 \par Pt presents today for post op thyroid surgery (10/25/19) f/u, feeling better with no major physical complaints. \par She reports feeling energized. She notes some hoarseness in her voice but no significant voice changes.\par Denies swallowing difficulties, breathing problems, weight loss, palpitations, pins and needle sensations in the lower extremities  and diarrhea.\par \par 12/06/2019 \par Pt with a hx of thyroidectomy for papillary thyroid CA, presents today for a thyroid f/u, feeling well with no physical complaints. She denies neck pain, palpitation, weight loss, and swallowing difficulty. Reports hoarseness is no longer present with improvement to voice changes. Pt has been off Oysco for more than 2 weeks.\par 2/19/2020\par Here for f/u and recommendations.\par Asymptomatic\par Current Medication:  levothyroxine 100 mcg QD and increased to 125 mcg qd on 12/6/19

## 2020-03-02 ENCOUNTER — APPOINTMENT (OUTPATIENT)
Dept: OTOLARYNGOLOGY | Facility: CLINIC | Age: 20
End: 2020-03-02
Payer: MEDICAID

## 2020-03-02 VITALS
OXYGEN SATURATION: 100 % | WEIGHT: 157 LBS | HEIGHT: 65 IN | DIASTOLIC BLOOD PRESSURE: 77 MMHG | SYSTOLIC BLOOD PRESSURE: 118 MMHG | TEMPERATURE: 98.9 F | HEART RATE: 70 BPM | BODY MASS INDEX: 26.16 KG/M2

## 2020-03-02 PROCEDURE — 99213 OFFICE O/P EST LOW 20 MIN: CPT | Mod: 25

## 2020-03-02 PROCEDURE — 11900 INJECT SKIN LESIONS </W 7: CPT

## 2020-03-02 NOTE — ASSESSMENT
[FreeTextEntry1] : Doing well; further care discussed.\par Return to clinic in 1 month, sooner as needed for any worsening or nonresolution of symptoms\par

## 2020-03-02 NOTE — DATA REVIEWED
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 2/3 LNs w/ mets, no extranodal extension \par 11/19: tgb 0.6

## 2020-03-02 NOTE — HISTORY OF PRESENT ILLNESS
[de-identified] : s/p thyroidectomy & level VI ND 10/25/19; doing well but has noticed that the incision has thickened up a little and there wasn't much response to the kenalog injected last time. She has a hx of a R auricular keloid. \par WILLIAM not planned. \par

## 2020-04-04 LAB
ALBUMIN SERPL ELPH-MCNC: 4.3 G/DL
ALP BLD-CCNC: 42 U/L
ALT SERPL-CCNC: 44 U/L
ANION GAP SERPL CALC-SCNC: 14 MMOL/L
AST SERPL-CCNC: 152 U/L
BASOPHILS # BLD AUTO: 0.02 K/UL
BASOPHILS NFR BLD AUTO: 0.3 %
BILIRUB SERPL-MCNC: 0.6 MG/DL
BUN SERPL-MCNC: 14 MG/DL
CALCIUM SERPL-MCNC: 9.3 MG/DL
CHLORIDE SERPL-SCNC: 107 MMOL/L
CO2 SERPL-SCNC: 23 MMOL/L
CREAT SERPL-MCNC: 0.91 MG/DL
EOSINOPHIL # BLD AUTO: 0.01 K/UL
EOSINOPHIL NFR BLD AUTO: 0.2 %
GLUCOSE SERPL-MCNC: 83 MG/DL
HCT VFR BLD CALC: 37.4 %
HGB BLD-MCNC: 11.5 G/DL
IMM GRANULOCYTES NFR BLD AUTO: 0.2 %
INR PPP: 0.94 RATIO
LYMPHOCYTES # BLD AUTO: 1.9 K/UL
LYMPHOCYTES NFR BLD AUTO: 29.2 %
MAN DIFF?: NORMAL
MCHC RBC-ENTMCNC: 28.3 PG
MCHC RBC-ENTMCNC: 30.7 GM/DL
MCV RBC AUTO: 92.1 FL
MONOCYTES # BLD AUTO: 0.31 K/UL
MONOCYTES NFR BLD AUTO: 4.8 %
NEUTROPHILS # BLD AUTO: 4.26 K/UL
NEUTROPHILS NFR BLD AUTO: 65.3 %
PLATELET # BLD AUTO: 250 K/UL
POTASSIUM SERPL-SCNC: 4.6 MMOL/L
PROT SERPL-MCNC: 6.9 G/DL
PT BLD: 10.7 SEC
RBC # BLD: 4.06 M/UL
RBC # FLD: 15.3 %
SODIUM SERPL-SCNC: 144 MMOL/L
TSH SERPL-ACNC: 1.32 UIU/ML
WBC # FLD AUTO: 6.51 K/UL

## 2020-04-06 ENCOUNTER — APPOINTMENT (OUTPATIENT)
Dept: OTOLARYNGOLOGY | Facility: CLINIC | Age: 20
End: 2020-04-06

## 2020-06-04 ENCOUNTER — APPOINTMENT (OUTPATIENT)
Dept: ENDOCRINOLOGY | Facility: CLINIC | Age: 20
End: 2020-06-04
Payer: MEDICAID

## 2020-06-04 VITALS
HEIGHT: 65 IN | BODY MASS INDEX: 25.33 KG/M2 | HEART RATE: 80 BPM | DIASTOLIC BLOOD PRESSURE: 73 MMHG | WEIGHT: 152 LBS | SYSTOLIC BLOOD PRESSURE: 125 MMHG

## 2020-06-04 PROCEDURE — 99215 OFFICE O/P EST HI 40 MIN: CPT

## 2020-06-05 NOTE — PHYSICAL EXAM
[Normal Outer Ear/Nose] : the ears and nose were normal in appearance [Alert] : alert [Normal Sclera/Conjunctiva] : normal sclera/conjunctiva [Well Healed Scar] : well healed scar [Clear to Auscultation] : lungs were clear to auscultation bilaterally [No Respiratory Distress] : no respiratory distress [Regular Rhythm] : with a regular rhythm [Normal Rate] : heart rate was normal [Spine Straight] : spine straight [Normal Bowel Sounds] : normal bowel sounds [No Edema] : no peripheral edema [Normal Reflexes] : deep tendon reflexes were 2+ and symmetric [Normal Gait] : normal gait [No Rash] : no rash [Oriented x3] : oriented to person, place, and time [de-identified] : no mass detected, no tenderness

## 2020-06-05 NOTE — REASON FOR VISIT
[Follow - Up] : a follow-up visit [Hypothyroidism] : hypothyroidism [Thyroid Cancer] : thyroid cancer [Other___] : [unfilled]

## 2020-06-05 NOTE — HISTORY OF PRESENT ILLNESS
[FreeTextEntry1] : 21 y/o F pt, 4 w, with Hx thyroid nodule (dx in ~2018), s/p thyroidectomy (10/25/19) for papillary thyroid CA.\par No FHx of thyroid disease or DM.\par Denies hx of head and neck radiation exposure during childhood. \par SHx: former smoker (quit on 6/23/19)\par \par 06/04/2020\par Pt, 4 weeks pregnant, presents today for thyroid f/u. Pt states that this is her first pregnancy and it was unplanned. Pt reports that she believed hospitals/pharmacies were closed during the pandemic, and therefore did not call for levothyroxine refills / follow up appt. Pt has also not been taking Levothyroxine due to insurance issues. However, pt reports that she is asymptomatic and has been feeling well. Pt followed with her PCP today who ordered pregnancy and other blood tests who referred her to OB/GYN. \par \par Current Medication: Bacitracin, Calcium Carbonate 600 mg, Hydrocodone-Acetaminophen, levothyroxine 150 mcg QD. \par \par Most recent lab studies:\par - 5/27/20: , Free T4 0.1, Free T3 0.56,  Thyroglobulin 14, Thyroglobulin ab neg\par \par Previous lab studies:\par - 2/3/20: TSH 15.9, Free T4 1.0, TPO ab neg, Thyroglobulin 0.46, Thyroglobulin ab neg\par - 10/25/19 Pathology: Thyroid with papillary thyroid carcinoma 3.6 cm, involving L lobe with tall cell features.\par - 10/11/19 Thyroid US: R lobe contains no nodules. L lobe contains one nodule, Mid to lower pole 3.7 x 2.1 x 2.3 cm solid hypoechoic with microcalcifications. Isthmus with no nodules.\par - 6/25/19 FNA Biopsy at [facility]: [place] VI, papillary thyroid CA\par 5/22/19 Thyroid US: L lobe solid heterogeneous nodule at the left mid and lower pole with punctate calcification measuring 3.7 x 1.7   x 2.6cm. Lymph node: L cervical lymph node 1.3 x 1.5 x 1.7 cm. R cervical lymph node 1.8 x 1.3 x 1.0 cm.\par - 5/22/19 s. creat 0.7, TSH 1.1, total T4 6.4

## 2020-06-05 NOTE — ADDENDUM
[FreeTextEntry1] : I, Mary Alicea, acted solely as a scribe for Dr. Scar Pinto on this date. 06/04/2020.

## 2020-06-05 NOTE — END OF VISIT
[FreeTextEntry3] : All medical record entries made by the Scribe were at my, Dr. Scar Pinto, direction and personally dictated by me on 06/04/2020. I have reviewed the chart and agree that the record accurately reflects my personal performance of the history, physical exam, assessment and plan. I have also personally directed, reviewed and agreed with the chart.  [Time Spent: ___ minutes] : I have spent [unfilled] minutes of time on the encounter. [>50% of the face to face encounter time was spent on counseling and/or coordination of care for ___] : Greater than 50% of the face to face encounter time was spent on counseling and/or coordination of care for [unfilled]

## 2020-06-05 NOTE — ASSESSMENT
[Levothyroxine] : The patient was instructed to take Levothyroxine on an empty stomach, separate from vitamins, and wait at least 30 minutes before eating [FreeTextEntry1] : 20 year female:\par \par 1. Pt underwent total thyroidectomy on 10/25/19 for papillary thyroid CA (3.6 cm involving L lobe, classic type with tall cell features of ~20% confined to the thyroid mass :\par Thyroglobulin level 0.6 on 11/2019. Stratification of her thyroid CA: Low risk for recurrence. On 12/2019, pt opted for medical management. No follow up since early 2/2020. No thyroid US. \par Today, she presents with abnormal thyroid levels from 5/27/20 with , and Free T4 0.1. More importantly, her thyroglobulin has elevated compared to 11/2019. \par She has not been taking her medications for past 2-3 months.\par Explained again the importance of adherence to treatment recommendation to pt. There is an increased risk for thyroid CA recurrence. Recommend immediate initiation of Levothyroxine. Resent prescription for two weeks, and will resent again once insurance coverage begins. \par Ordered thyroid US. \par \par 2. Per pt, she missed her menstrual period last month and tested positive for urine/pregnancy test. She saw her PCP earlier today and she has already been referred to see OB/GYN. Explained pt that it is vital for a fetal to receives thyroid hormones from the mother during the first trimester, and the risk developing congenital hypothyroidism. We are referring pt to OB/GYN.\par Advised pt to call me to discuss further in a week. \par \par Return in: 2 months to restage her thyroid CA.

## 2020-06-12 ENCOUNTER — APPOINTMENT (OUTPATIENT)
Dept: OBGYN | Facility: CLINIC | Age: 20
End: 2020-06-12

## 2020-06-15 LAB — T3FREE SERPL-MCNC: 0.56 PG/ML

## 2020-06-21 LAB
T4 FREE SERPL-MCNC: 0.1 NG/DL
THYROGLOB AB SERPL-ACNC: <20 IU/ML
THYROGLOB SERPL-MCNC: 14 NG/ML
TSH SERPL-ACNC: 177 UIU/ML

## 2020-06-29 ENCOUNTER — LABORATORY RESULT (OUTPATIENT)
Age: 20
End: 2020-06-29

## 2020-07-01 ENCOUNTER — APPOINTMENT (OUTPATIENT)
Dept: ENDOCRINOLOGY | Facility: CLINIC | Age: 20
End: 2020-07-01
Payer: MEDICAID

## 2020-07-01 DIAGNOSIS — Z34.90 ENCOUNTER FOR SUPERVISION OF NORMAL PREGNANCY, UNSPECIFIED, UNSPECIFIED TRIMESTER: ICD-10-CM

## 2020-07-01 LAB
T3FREE SERPL-MCNC: 3.15 PG/ML
T4 FREE SERPL-MCNC: 1.7 NG/DL
THYROGLOB AB SERPL-ACNC: <20 IU/ML
THYROGLOB SERPL-MCNC: 2.01 NG/ML
TSH SERPL-ACNC: 5.43 UIU/ML

## 2020-07-01 PROCEDURE — 99215 OFFICE O/P EST HI 40 MIN: CPT | Mod: 95

## 2020-07-06 PROBLEM — Z34.90 PREGNANCY: Status: ACTIVE | Noted: 2020-06-04

## 2020-07-06 NOTE — ADDENDUM
[FreeTextEntry1] : I, Mary Alicea, acted solely as a scribe for Dr. Scar Pinto on this date. 07/01/2020.

## 2020-07-06 NOTE — REVIEW OF SYSTEMS
[Recent Weight Loss (___ Lbs)] : recent weight loss: [unfilled] lbs [Negative] : Endocrine [Palpitations] : no palpitations

## 2020-07-06 NOTE — HISTORY OF PRESENT ILLNESS
[Home] : at home, [unfilled] , at the time of the visit. [Medical Office: ([facility])___] : at the medical office located in  [Verbal consent obtained from patient] : the patient, [unfilled] [FreeTextEntry3] : Scarlett Dhaliwal [FreeTextEntry1] : 19 y/o F pt, ~ 8 weeks pregnant, with Hx thyroid nodule (dx in ~2018), s/p thyroidectomy (10/25/19) for papillary thyroid CA.\par No FHx of thyroid disease or DM.\par Denies hx of head and neck radiation exposure during childhood. \par SHx: former smoker (quit on 6/23/19)\par \par 06/04/2020\par Pt, ~4 weeks pregnant, presents today for thyroid f/u. Pt states that this is her first pregnancy and it was unplanned. Pt reports that she believed hospitals/pharmacies were closed during the pandemic, and therefore did not call for levothyroxine refills / follow up appt. Pt has also not been taking Levothyroxine due to insurance issues. However, pt reports that she is asymptomatic and has been feeling well. Pt followed with her PCP today who ordered pregnancy and other blood tests and referred her to OB/GYN. \par \par 07/01/2020 \par Pt did not have any questions prior to the initiation of the telehealth visit. \par Pt, 8 weeks pregnant, presents today via telehealth for thyroid f/u, feeling well. Pt has decided to terminate the pregnancy. [facility] ago and has follow-up appointment tomorrow. Pt has lost 5 lbs since last visit due to dieting. \par Denies palpitations,GI disturbances\par \par Current Medications: Bacitracin, Calcium Carbonate 600 mg, Hydrocodone -Acetaminophen, Levothyroxine 150 mcg QD. \par \par Most recent lab studies:\par - 6/29/20: TSH 5.43, Free T4 1.7, Free T3 3.15, TPO ab neg, Thyroglobulin 2.01, Thyroglobulin ab neg\par \par Previous lab studies:\par - 5/27/20: , Free T4 0.1, Free T3 0.56, Thyroglobulin 14, Thyroglobulin ab neg\par - 2/3/20: TSH 15.9, Free T4 1.0, TPO ab neg, Thyroglobulin 0.46, Thyroglobulin ab neg\par - 10/25/19 Pathology: Thyroid with papillary thyroid carcinoma 3.6 cm, involving L lobe with tall cell features.\par - 10/11/19 Thyroid US: R lobe contains no nodules. L lobe contains one nodule, Mid to lower pole 3.7 x 2.1 x 2.3 cm solid hypoechoic with microcalcifications. Isthmus with no nodules.\par - 6/25/19 FNA Biopsy at [facility]: [place] VI, papillary thyroid CA\par 5/22/19 Thyroid US: L lobe solid heterogeneous nodule at the left mid and lower pole with punctate calcification measuring 3.7 x 1.7 x 2.6cm. Lymph node: L cervical lymph node 1.3 x 1.5 x 1.7 cm. R cervical lymph node 1.8 x 1.3 x 1.0 cm.\par - 5/22/19 s. creat 0.7, TSH 1.1, total T4 6.4

## 2020-07-06 NOTE — END OF VISIT
[FreeTextEntry3] : All medical record entries made by the Scribe were at my, Dr. Scar Pinto, direction and personally dictated by me on 07/01/2020. I have reviewed the chart and agree that the record accurately reflects my personal performance of the history, physical exam, assessment and plan. I have also personally directed, reviewed and agreed with the chart.  [Time Spent: ___ minutes] : I have spent [unfilled] minutes of time on the encounter.

## 2020-07-06 NOTE — ASSESSMENT
[Levothyroxine] : The patient was instructed to take Levothyroxine on an empty stomach, separate from vitamins, and wait at least 30 minutes before eating [FreeTextEntry1] : 21 y/o F pt with:\par \par 1. s/p total thyroidectomy on 10/25/19 for papillary thyroid CA (3.6 cm involving L lobe, classic type with tall cell features of ~20% confined to the thyroid mass) :\par Pt was previously seen on 6/4/20. On 5/27/20, her TSH was 177 (pt was not taking ,levo for few weeks) and her Thyroglobulin was 14. Pt was immediately restarted on Levothyroxine 150 mcg with clinical and biochemical improvement. Recent TSH 5.43, Free T4  1.7 and Thyroglobulin 2.01 on 6/29/20.\par Pt is scheduled to have pregnancy interruption this week. She asked questions about pregnancy and thyroid; reassured pt that I do not foresee her having problems with starting her own family in the future as long as she continues to take her medication and monitor TFT. \par Continue with Levothyroxine 150 mcg. \par \par Return in: 1 month

## 2020-07-17 ENCOUNTER — LABORATORY RESULT (OUTPATIENT)
Age: 20
End: 2020-07-17

## 2020-07-20 ENCOUNTER — APPOINTMENT (OUTPATIENT)
Dept: ENDOCRINOLOGY | Facility: CLINIC | Age: 20
End: 2020-07-20

## 2020-07-23 ENCOUNTER — APPOINTMENT (OUTPATIENT)
Dept: ENDOCRINOLOGY | Facility: CLINIC | Age: 20
End: 2020-07-23
Payer: MEDICAID

## 2020-07-23 VITALS
WEIGHT: 151 LBS | HEIGHT: 65 IN | HEART RATE: 66 BPM | DIASTOLIC BLOOD PRESSURE: 65 MMHG | BODY MASS INDEX: 25.16 KG/M2 | SYSTOLIC BLOOD PRESSURE: 118 MMHG

## 2020-07-23 PROCEDURE — 99215 OFFICE O/P EST HI 40 MIN: CPT

## 2020-07-24 NOTE — ADDENDUM
[FreeTextEntry1] : I, Mary Alicea, acted solely as a scribe for Dr. Scar Pinto on this date. 07/23/2020.

## 2020-07-24 NOTE — ASSESSMENT
[Levothyroxine] : The patient was instructed to take Levothyroxine on an empty stomach, separate from vitamins, and wait at least 30 minutes before eating [FreeTextEntry1] : 21 y/o F pt with:\par \par 1. s/p total thyroidectomy on 10/25/19 for papillary thyroid CA (3.6 cm involving L lobe, classic type with tall cell features of ~20% confined to the thyroid mass) :\par Pt was previously seen on 6/4/20. On 5/27/20, her TSH was 177 (pt was not taking ,levo for few weeks) and her Thyroglobulin was 14. Pt was immediately restarted on Levothyroxine 150 mcg with clinical and biochemical improvement. Recent TSH 5.43, Free T4 1.7 and Thyroglobulin 2.01 on 6/29/20.\par Pt had interruption in her pregnancy. \par Pt is currently on Levothyroxine 150 mcg. She missed her dose for few days. Her recent labs reveals TSH 7.36, and thyroglobulin 6.03 down from 14. This might represent presence of thyroid tissue,or a probable thyroid residual tumor. Recommend pt to increase Levothyroxine to 175 mcg QD. \par Will order TFTs and  thyroid US before next visit. \par \par Return in: 1 months\par

## 2020-07-24 NOTE — END OF VISIT
[FreeTextEntry3] : All medical record entries made by the Scribe were at my, Dr. Scar Pinto, direction and personally dictated by me on 07/23/2020. I have reviewed the chart and agree that the record accurately reflects my personal performance of the history, physical exam, assessment and plan. I have also personally directed, reviewed and agreed with the chart.  [Time Spent: ___ minutes] : I have spent [unfilled] minutes of time on the encounter. [>50% of the face to face encounter time was spent on counseling and/or coordination of care for ___] : Greater than 50% of the face to face encounter time was spent on counseling and/or coordination of care for [unfilled]

## 2020-07-24 NOTE — HISTORY OF PRESENT ILLNESS
[FreeTextEntry1] : 21 y/o F pt, with Hx of hypothyroidism s/p thyroidectomy on 10/25/19 for papillary thyroid CA, Hx of thyroid nodule (dx in ~2018), .\par No FHx of thyroid disease or DM.\par Denies hx of head and neck radiation exposure during childhood. \par SHx: Former smoker (quit on 19)\par \par 2020 \par Pt presents today for thyroid f/u, feeling well with no acute complaints. She did not take her thyroid medications from 7/10/20 to 7/15/20.  She had an  on 20. \par Denies palpitations and GI disturbances. \par \par Current Medications: Bacitracin, Calcium Carbonate 600 mg, Hydrocodone -Acetaminophen, Levothyroxine 150 mcg QD. \par \par Most recent lab studies:\par - 20: TSH 7.36, Free T4 1.3, TPO ab neg, Thyroglobulin 6.03, Thyroglobulin ab neg, \par - 20: TSH 5.43, Free T4 1.7, Free T3 3.15, TPO ab neg, Thyroglobulin 2.01, Thyroglobulin ab neg\par \par Previous lab studies:\par - 20: , Free T4 0.1, Free T3 0.56, Thyroglobulin 14, Thyroglobulin ab neg\par - 2/3/20: TSH 15.9, Free T4 1.0, TPO ab neg, Thyroglobulin 0.46, Thyroglobulin ab neg\par - 10/25/19 Pathology: Thyroid with papillary thyroid carcinoma 3.6 cm, involving L lobe with tall cell features.\par - 10/11/19 Thyroid US: R lobe contains no nodules. L lobe contains one nodule, Mid to lower pole 3.7 x 2.1 x 2.3 cm solid hypoechoic with microcalcifications. Isthmus with no nodules.\par - 19 FNA Biopsy at [facility]: [place] VI, papillary thyroid CA\par 19 Thyroid US: L lobe solid heterogeneous nodule at the left mid and lower pole with punctate calcification measuring 3.7 x 1.7 x 2.6cm. Lymph node: L cervical lymph node 1.3 x 1.5 x 1.7 cm. R cervical lymph node 1.8 x 1.3 x 1.0 cm.\par - 19 s. creat 0.7, TSH 1.1, total T4 6.4

## 2020-07-24 NOTE — REVIEW OF SYSTEMS
[Negative] : Endocrine [Palpitations] : no palpitations [Constipation] : no constipation [Diarrhea] : no diarrhea

## 2020-08-17 ENCOUNTER — APPOINTMENT (OUTPATIENT)
Dept: ENDOCRINOLOGY | Facility: CLINIC | Age: 20
End: 2020-08-17

## 2020-08-21 LAB
T4 FREE SERPL-MCNC: 1.3 NG/DL
THYROGLOB AB SERPL-ACNC: <20 IU/ML
THYROGLOB SERPL-MCNC: 6.03 NG/ML
TSH SERPL-ACNC: 7.36 UIU/ML

## 2020-10-16 ENCOUNTER — APPOINTMENT (OUTPATIENT)
Dept: ULTRASOUND IMAGING | Facility: CLINIC | Age: 20
End: 2020-10-16
Payer: MEDICAID

## 2020-10-16 ENCOUNTER — OUTPATIENT (OUTPATIENT)
Dept: OUTPATIENT SERVICES | Facility: HOSPITAL | Age: 20
LOS: 1 days | End: 2020-10-16

## 2020-10-16 PROCEDURE — 76536 US EXAM OF HEAD AND NECK: CPT | Mod: 26

## 2020-10-26 ENCOUNTER — NON-APPOINTMENT (OUTPATIENT)
Age: 20
End: 2020-10-26

## 2020-10-27 ENCOUNTER — APPOINTMENT (OUTPATIENT)
Dept: ENDOCRINOLOGY | Facility: CLINIC | Age: 20
End: 2020-10-27
Payer: MEDICAID

## 2020-10-27 ENCOUNTER — LABORATORY RESULT (OUTPATIENT)
Age: 20
End: 2020-10-27

## 2020-10-27 VITALS
WEIGHT: 146 LBS | DIASTOLIC BLOOD PRESSURE: 60 MMHG | SYSTOLIC BLOOD PRESSURE: 111 MMHG | HEART RATE: 60 BPM | HEIGHT: 65 IN | BODY MASS INDEX: 24.32 KG/M2

## 2020-10-27 DIAGNOSIS — R74.8 ABNORMAL LEVELS OF OTHER SERUM ENZYMES: ICD-10-CM

## 2020-10-27 PROCEDURE — 99072 ADDL SUPL MATRL&STAF TM PHE: CPT

## 2020-10-27 PROCEDURE — 99214 OFFICE O/P EST MOD 30 MIN: CPT | Mod: 25

## 2020-10-27 NOTE — PHYSICAL EXAM
[Alert] : alert [Normal Sclera/Conjunctiva] : normal sclera/conjunctiva [Normal Outer Ear/Nose] : the ears and nose were normal in appearance [No Respiratory Distress] : no respiratory distress [Clear to Auscultation] : lungs were clear to auscultation bilaterally [Normal Rate] : heart rate was normal [Regular Rhythm] : with a regular rhythm [No Edema] : no peripheral edema [Normal Bowel Sounds] : normal bowel sounds [Spine Straight] : spine straight [No Stigmata of Cushings Syndrome] : no stigmata of Cushings Syndrome [Normal Gait] : normal gait [No Rash] : no rash [Normal Reflexes] : deep tendon reflexes were 2+ and symmetric [Oriented x3] : oriented to person, place, and time [No Neck Mass] : no neck mass was observed [Thyroid Not Enlarged] : the thyroid was not enlarged [No Thyroid Nodules] : no palpable thyroid nodules

## 2020-10-28 PROBLEM — R74.8 ABNORMAL LIVER ENZYMES: Status: ACTIVE | Noted: 2020-10-28

## 2020-10-28 LAB
ALBUMIN SERPL ELPH-MCNC: 4.6 G/DL
ALP BLD-CCNC: 55 U/L
ALT SERPL-CCNC: 92 U/L
ANION GAP SERPL CALC-SCNC: 11 MMOL/L
AST SERPL-CCNC: 173 U/L
BILIRUB SERPL-MCNC: 0.5 MG/DL
BUN SERPL-MCNC: 16 MG/DL
CALCIUM SERPL-MCNC: 9.6 MG/DL
CHLORIDE SERPL-SCNC: 101 MMOL/L
CO2 SERPL-SCNC: 27 MMOL/L
CREAT SERPL-MCNC: 0.6 MG/DL
GLUCOSE SERPL-MCNC: 65 MG/DL
POTASSIUM SERPL-SCNC: 4.7 MMOL/L
PROT SERPL-MCNC: 6.7 G/DL
SODIUM SERPL-SCNC: 139 MMOL/L
T4 FREE SERPL-MCNC: 2 NG/DL
THYROGLOB AB SERPL-ACNC: <20 IU/ML
THYROGLOB SERPL-MCNC: <0.2 NG/ML
TSH SERPL-ACNC: 0.01 UIU/ML

## 2020-10-28 NOTE — ADDENDUM
[FreeTextEntry1] : I, Dakota Cedeño, acted solely as a scribe for Dr. Scar Pinto on this date. 10/27/2020.

## 2020-10-28 NOTE — ASSESSMENT
[Levothyroxine] : The patient was instructed to take Levothyroxine on an empty stomach, separate from vitamins, and wait at least 30 minutes before eating [FreeTextEntry1] : must take levothyroxine every day

## 2020-10-28 NOTE — HISTORY OF PRESENT ILLNESS
[FreeTextEntry1] : 21 y/o F pt, with Hx of hypothyroidism s/p thyroidectomy on 10/25/19 for papillary thyroid CA, Hx of thyroid nodule (dx in ~2018).\par No FHx of thyroid disease or DM.\par Denies hx of head and neck radiation exposure during childhood. \par SHx: Former smoker (quit on 19)\par \par 2020 \par Pt presents today for thyroid f/u, feeling well with no acute complaints. She did not take her thyroid medications from 7/10/20 to 7/15/20.  She had an  on 20. \par Denies palpitations and GI disturbances. \par \par 10/27/20\par Today pt presents for thyroid f/u, feeling well with c/o some voice changes which she relates to her weight loss, some pressure and soreness in the L side of her neck which may be related to the healing process, along with some rare moments of swallowing difficulties She states she is working and going to school. \par Pt lost 5lbs since 2020.\par Denies palpitations and GI disturbances.\par \par Current Medications: Bacitracin, Calcium Carbonate 600 mg, Hydrocodone -Acetaminophen, Levothyroxine 200 mcg QD. \par \par Most recent lab studies:\par - 10/16/20 Thyroid US: Findings: s/p total thyroidectomy, no residual tissue in thyroid bed and lymph nodes appears to be normal. \par - 20: TSH 7.36, Free T4 1.3, TPO ab neg, Thyroglobulin 6.03, Thyroglobulin ab neg, \par - 20: TSH 5.43, Free T4 1.7, Free T3 3.15, TPO ab neg, Thyroglobulin 2.01, Thyroglobulin ab neg\par \par Previous lab studies:\par - 20: , Free T4 0.1, Free T3 0.56, Thyroglobulin 14, Thyroglobulin ab neg\par - 2/3/20: TSH 15.9, Free T4 1.0, TPO ab neg, Thyroglobulin 0.46, Thyroglobulin ab neg\par - 10/25/19 Pathology: Thyroid with papillary thyroid carcinoma 3.6 cm, involving L lobe classic type with tall cell features ~20% confined to the thyroid gland. Margins negative for tumor. Level 6 Neck Contents 2/3 Lymph nodes with metastatic papillary CA without extranodal extension. \par - 10/11/19 Thyroid US: R lobe contains no nodules. L lobe contains one nodule, Mid to lower pole 3.7 x 2.1 x 2.3 cm solid hypoechoic with microcalcifications. Isthmus with no nodules.\par - 19 FNA Biopsy at [facility]: [place] VI, papillary thyroid CA\par 19 Thyroid US: L lobe solid heterogeneous nodule at the left mid and lower pole with punctate calcification measuring 3.7 x 1.7 x 2.6cm. Lymph node: L cervical lymph node 1.3 x 1.5 x 1.7 cm. R cervical lymph node 1.8 x 1.3 x 1.0 cm.\par - 19 s. creat 0.7, TSH 1.1, total T4 6.4

## 2020-10-28 NOTE — REVIEW OF SYSTEMS
[Recent Weight Loss (___ Lbs)] : recent weight loss: [unfilled] lbs [Negative] : Psychiatric [As Noted in HPI] : as noted in HPI [Palpitations] : no palpitations [FreeTextEntry4] : mild L neck soreness/pressure, mild voice changes, mild difficulty swallowing  [FreeTextEntry7] : denies GI disturbances

## 2020-10-28 NOTE — END OF VISIT
[Time Spent: ___ minutes] : I have spent [unfilled] minutes of time on the encounter. [>50% of the face to face encounter time was spent on counseling and/or coordination of care for ___] : Greater than 50% of the face to face encounter time was spent on counseling and/or coordination of care for [unfilled] [FreeTextEntry3] : All medical record entries made by the Scribe were at my, Dr. Scar Pinto, direction and personally dictated by me on 10/27/2020. I have reviewed the chart and agree that the record accurately reflects my personal performance of the history, physical exam, assessment and plan. I have also personally directed, reviewed and agreed with the chart.

## 2020-11-02 ENCOUNTER — APPOINTMENT (OUTPATIENT)
Dept: OTOLARYNGOLOGY | Facility: CLINIC | Age: 20
End: 2020-11-02
Payer: MEDICAID

## 2020-11-02 VITALS
WEIGHT: 146 LBS | TEMPERATURE: 98.3 F | BODY MASS INDEX: 24.32 KG/M2 | SYSTOLIC BLOOD PRESSURE: 123 MMHG | HEIGHT: 65 IN | HEART RATE: 73 BPM | OXYGEN SATURATION: 94 % | DIASTOLIC BLOOD PRESSURE: 74 MMHG

## 2020-11-02 PROCEDURE — 31575 DIAGNOSTIC LARYNGOSCOPY: CPT

## 2020-11-02 PROCEDURE — 99072 ADDL SUPL MATRL&STAF TM PHE: CPT

## 2020-11-02 PROCEDURE — 99214 OFFICE O/P EST MOD 30 MIN: CPT | Mod: 25

## 2020-11-02 NOTE — PHYSICAL EXAM
[de-identified] : well-healed low collar incision [Normal] : no abnormal secretions [Laryngoscopy Performed] : laryngoscopy was performed, see procedure section for findings

## 2020-11-02 NOTE — REASON FOR VISIT
[Subsequent Evaluation] : a subsequent evaluation for [FreeTextEntry2] : neck soreness & trouble swallowing

## 2020-11-02 NOTE — HISTORY OF PRESENT ILLNESS
[de-identified] : s/p thyroidectomy & level VI ND 10/25/19 & no postop WILLIAM. She went off her levo for a while and returned to see endo with elevated TSH & thyroglobulin 6/20 which decreased with resumption of tx; a thyroid bed sono was unremarkable. \par She now returns c/o 1 month of L neck soreness (points at the SCM) which has been improving. \par Also w/ dysphagia to solids multiple times/day since around June; this is stable and not worsening. 5 lbs wt loss since then she thinks d/t going to the gym; no hoarseness, globus or hemoptysis. She has been having some anxiety and thinks that this might be related. \par \par Covid-19 screening questions: \par   Sick contacts: no\par   Recent international travel: no\par   Shortness of breath: no\par   New or productive cough: no\par   Fevers/chills/night sweats: no\par   COVID-19 testing: none\par

## 2020-11-02 NOTE — PROCEDURE
[de-identified] : We discussed the elevated risk of liberation of viral particles from the nasopharynx if the patient were to be asymptomatically infected with COVID-19; after weighing the risks & benefits the decision was made to proceed. The procedure was performed while wearing appropriate PPE and a camera attached to a video system was used to maximize separation from the patient. The scope was handled appropriately. \par Indication: requirement for exam not possible via anterior examination; dysphagia\par After verbal consent and the conservative administration of an aerosolized phenylephrine/lidocaine mix, examination was performed with a flexible endoscope placed through the nose. Findings:\par Nasopharynx: unremarkable\par Soft palate, lateral and posterior pharyngeal walls: unremarkable\par Base of tongue & lingual tonsil: minimal retrodisplacement\par Vallecula: unremarkable\par Epiglottis: unremarkable\par Piriform sinuses and pharyngoesophageal junction: unremarkable\par Arytenoids and AE folds: mild interarytenoid edema\par Ventricle and false vocal folds: unremarkable\par True vocal folds: Smooth free edge; surface without ectasias or edema; normal movement bilaterally with good apposition in phonation\par Visible subglottis: unremarkable\par Other findings: none

## 2020-11-02 NOTE — ASSESSMENT
[FreeTextEntry1] : MBS & will call w/ results; otherwise she seems to be doing well. She will discuss her fully suppressed TSH w/ Dr. Pinto. RTC 1 month

## 2020-12-07 ENCOUNTER — APPOINTMENT (OUTPATIENT)
Dept: OTOLARYNGOLOGY | Facility: CLINIC | Age: 20
End: 2020-12-07
Payer: MEDICAID

## 2020-12-07 VITALS
WEIGHT: 146 LBS | BODY MASS INDEX: 24.32 KG/M2 | HEIGHT: 65 IN | HEART RATE: 74 BPM | SYSTOLIC BLOOD PRESSURE: 128 MMHG | DIASTOLIC BLOOD PRESSURE: 86 MMHG | OXYGEN SATURATION: 96 % | TEMPERATURE: 98.3 F

## 2020-12-07 DIAGNOSIS — L91.0 HYPERTROPHIC SCAR: ICD-10-CM

## 2020-12-07 DIAGNOSIS — R13.10 DYSPHAGIA, UNSPECIFIED: ICD-10-CM

## 2020-12-07 PROCEDURE — 99214 OFFICE O/P EST MOD 30 MIN: CPT

## 2020-12-07 PROCEDURE — 99072 ADDL SUPL MATRL&STAF TM PHE: CPT

## 2020-12-07 NOTE — HISTORY OF PRESENT ILLNESS
[de-identified] : s/p thyroidectomy & level VI ND 10/25/19 & no postop WILLIAM. Stable course w/ no planned WILLIAM d/t undetectable thyroglobulin. No related complaints. \par She now returns after some neck soreness which has resolved. However, she has ongoing dysphagia to solids multiple times/day since around June; this is stable and not worsening. 5 lbs wt loss since then she thinks d/t going to the gym; no hoarseness, globus or hemoptysis. An MBS is still pending. \par \par Covid-19 screening questions: \par   Sick contacts: no\par   Recent international travel: no\par   Shortness of breath: no\par   New or productive cough: no\par   Fevers/chills/night sweats: no\par   COVID-19 testing: neg Ab 10/20\par

## 2020-12-07 NOTE — DATA REVIEWED
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 2/3 LNs w/ mets, no extranodal extension \par 11/19: tgb 0.6\par 5/20: , tgb 14\par 10/20: TSH 0.01, Tgb undetectable [de-identified] : 10/20: no ST in prior thyroid bed; LNs unremarkable

## 2020-12-29 ENCOUNTER — APPOINTMENT (OUTPATIENT)
Dept: RADIOLOGY | Facility: HOSPITAL | Age: 20
End: 2020-12-29
Payer: MEDICAID

## 2020-12-29 ENCOUNTER — OUTPATIENT (OUTPATIENT)
Dept: OUTPATIENT SERVICES | Facility: HOSPITAL | Age: 20
LOS: 1 days | End: 2020-12-29
Payer: COMMERCIAL

## 2020-12-29 PROCEDURE — 74230 X-RAY XM SWLNG FUNCJ C+: CPT | Mod: 26

## 2020-12-29 PROCEDURE — 74230 X-RAY XM SWLNG FUNCJ C+: CPT

## 2020-12-29 PROCEDURE — 92611 MOTION FLUOROSCOPY/SWALLOW: CPT | Mod: GN

## 2021-01-05 ENCOUNTER — NON-APPOINTMENT (OUTPATIENT)
Age: 21
End: 2021-01-05

## 2021-02-01 ENCOUNTER — APPOINTMENT (OUTPATIENT)
Dept: ENDOCRINOLOGY | Facility: CLINIC | Age: 21
End: 2021-02-01

## 2022-01-12 ENCOUNTER — APPOINTMENT (OUTPATIENT)
Dept: ENDOCRINOLOGY | Facility: CLINIC | Age: 22
End: 2022-01-12
Payer: MEDICAID

## 2022-01-12 VITALS
HEART RATE: 63 BPM | HEIGHT: 65 IN | SYSTOLIC BLOOD PRESSURE: 111 MMHG | DIASTOLIC BLOOD PRESSURE: 52 MMHG | BODY MASS INDEX: 22.82 KG/M2 | WEIGHT: 137 LBS

## 2022-01-12 PROCEDURE — 99215 OFFICE O/P EST HI 40 MIN: CPT

## 2022-01-14 NOTE — PHYSICAL EXAM
[Alert] : alert [Normal Sclera/Conjunctiva] : normal sclera/conjunctiva [Normal Outer Ear/Nose] : the ears and nose were normal in appearance [No Neck Mass] : no neck mass was observed [Thyroid Not Enlarged] : the thyroid was not enlarged [No Thyroid Nodules] : no palpable thyroid nodules [No Respiratory Distress] : no respiratory distress [Clear to Auscultation] : lungs were clear to auscultation bilaterally [Normal Rate] : heart rate was normal [Regular Rhythm] : with a regular rhythm [No Edema] : no peripheral edema [Normal Bowel Sounds] : normal bowel sounds [Spine Straight] : spine straight [No Stigmata of Cushings Syndrome] : no stigmata of Cushings Syndrome [Normal Gait] : normal gait [No Rash] : no rash [Normal Reflexes] : deep tendon reflexes were 2+ and symmetric [Oriented x3] : oriented to person, place, and time [de-identified] : LN palpated (R side: in Level 3; L side: Level 2)

## 2022-01-14 NOTE — HISTORY OF PRESENT ILLNESS
[FreeTextEntry1] : 20 [de-identified] : 1 [de-identified] : 10/16/20  [de-identified] : s/p total thyroidectomy, no residual tissue in thyroid bed and lymph nodes appears to be normal.  [de-identified] : 10/11/19 [de-identified] : R lobe contains no nodules. L lobe contains one nodule, Mid to lower pole 3.7 x 2.1 x 2.3 cm solid hypoechoic with microcalcifications. Isthmus with no nodules. 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 Margins negative for tumor. Level 6 Neck Contents 2/3 Lymph nodes with metastatic papillary CA without extranodal extension. \par

## 2022-01-14 NOTE — ASSESSMENT
[Levothyroxine] : The patient was instructed to take Levothyroxine on an empty stomach, separate from vitamins, and wait at least 30 minutes before eating [FreeTextEntry1] : 22 y/o F pt with:\par \par 1. s/p total thyroidectomy on 10/25/19 for papillary thyroid CA (3.6 cm involving L lobe, classic type with tall cell features of ~20% confined to the thyroid mass), T2 N1a. No WILLIAM  :\par Pt seen by Dr. Fredi Lopez ([facility]) for dysphagia. Her Modified Barium Swallow (MBS) was normal and was recommended to return to him prn. Last endo visit,10/27/20\par Pt states that she is asymptomatic and denies dysphagia, breathing difficulties, and dysphonia. She has lost 14 lbs since 07/2020 by choice. \par Continue with  Levothyroxine.\par Order Thyroid US, and TFT including thyroglobulin levels. Once the US is completed, pt will call my office to discuss her results. \par \par Return in: 6 months

## 2022-01-14 NOTE — END OF VISIT
[FreeTextEntry3] : All medical record entries made by the Scribe were at my, Dr. Scar Pinto, direction and personally dictated by me on 01/12/2022. I have reviewed the chart and agree that the record accurately reflects my personal performance of the history, physical exam, assessment and plan. I have also personally directed, reviewed and agreed with the chart.  [Time Spent: ___ minutes] : I have spent [unfilled] minutes of time on the encounter.

## 2022-01-14 NOTE — ADDENDUM
[FreeTextEntry1] : I Olgayana Augustin act soley as a scribe for Dr. Scar Pinto on this date. 01/12/2022

## 2022-01-14 NOTE — DATA REVIEWED
[FreeTextEntry1] : Labs: \par - 10/27/20: s.creat 0.60,  (H), TPO Ab 16.0, Thyroglobulin <0.20 (L)\par - 7/17/20: TSH 7.36, Free T4 1.3, TPO ab neg, Thyroglobulin 6.03, Thyroglobulin ab neg, \par - 6/29/20: TSH 5.43, Free T4 1.7, Free T3 3.15, TPO ab neg, Thyroglobulin 2.01, Thyroglobulin ab neg\par - 5/27/20: , Free T4 0.1, Free T3 0.56, Thyroglobulin 14, Thyroglobulin ab neg\par - 02/03/20: TSH 15.9, Free T4 1.0, TPO ab neg, Thyroglobulin 0.46, Thyroglobulin ab neg\par - 05/22/19: s. creat 0.7, TSH 1.1, total T4 6.4\par \par Imaging:\par - 10/16/20 Thyroid US: Findings: s/p total thyroidectomy, no residual tissue in thyroid bed and lymph nodes appears to be normal. 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 Margins negative for tumor. Level 6 Neck Contents 2/3 Lymph nodes with metastatic papillary CA without extranodal extension. \par - 10/11/19 Thyroid US: R lobe contains no nodules. L lobe contains one nodule, Mid to lower pole 3.7 x 2.1 x 2.3 cm solid hypoechoic with microcalcifications. Isthmus with no nodules.\par - 06/25/19 FNA Biopsy at [facility]: [place] VI, papillary thyroid CA\par - 05/22/19 Thyroid US: L lobe solid heterogeneous nodule at the left mid and lower pole with punctate calcification measuring 3.7 x 1.7 x 2.6cm. Lymph node: L cervical lymph node 1.3 x 1.5 x 1.7 cm. R cervical lymph node 1.8 x 1.3 x 1.0 cm.\par

## 2022-01-14 NOTE — REVIEW OF SYSTEMS
[Recent Weight Loss (___ Lbs)] : recent weight loss: [unfilled] lbs [As Noted in HPI] : as noted in HPI [Negative] : Heme/Lymph [Dysphagia] : no dysphagia [Dysphonia] : no dysphonia [Palpitations] : no palpitations [Difficulty Breathing] : no dyspnea [Vomiting] : no vomiting [Diarrhea] : no diarrhea [FreeTextEntry4] : mild L neck soreness/pressure, mild voice changes, mild difficulty swallowing

## 2022-03-15 ENCOUNTER — RESULT REVIEW (OUTPATIENT)
Age: 22
End: 2022-03-15

## 2022-03-15 ENCOUNTER — OUTPATIENT (OUTPATIENT)
Dept: OUTPATIENT SERVICES | Facility: HOSPITAL | Age: 22
LOS: 1 days | End: 2022-03-15

## 2022-03-15 ENCOUNTER — APPOINTMENT (OUTPATIENT)
Dept: ULTRASOUND IMAGING | Facility: CLINIC | Age: 22
End: 2022-03-15
Payer: MEDICAID

## 2022-03-15 PROCEDURE — 76536 US EXAM OF HEAD AND NECK: CPT | Mod: 26

## 2022-06-27 ENCOUNTER — LABORATORY RESULT (OUTPATIENT)
Age: 22
End: 2022-06-27

## 2022-06-28 LAB
ALBUMIN SERPL ELPH-MCNC: 4.9 G/DL
ALP BLD-CCNC: 70 U/L
ALT SERPL-CCNC: 25 U/L
ANION GAP SERPL CALC-SCNC: 13 MMOL/L
AST SERPL-CCNC: 105 U/L
BILIRUB SERPL-MCNC: 0.8 MG/DL
BUN SERPL-MCNC: 11 MG/DL
CALCIUM SERPL-MCNC: 8.9 MG/DL
CHLORIDE SERPL-SCNC: 102 MMOL/L
CO2 SERPL-SCNC: 24 MMOL/L
CREAT SERPL-MCNC: 0.7 MG/DL
EGFR: 125 ML/MIN/1.73M2
GLUCOSE SERPL-MCNC: 96 MG/DL
POTASSIUM SERPL-SCNC: 4.5 MMOL/L
PROT SERPL-MCNC: 7.5 G/DL
SODIUM SERPL-SCNC: 139 MMOL/L
T3FREE SERPL-MCNC: 3.98 PG/ML
T4 FREE SERPL-MCNC: 3.1 NG/DL
THYROGLOB AB SERPL-ACNC: <20 IU/ML
THYROGLOB SERPL-MCNC: <0.2 NG/ML
TSH SERPL-ACNC: 0.09 UIU/ML

## 2022-07-11 ENCOUNTER — APPOINTMENT (OUTPATIENT)
Dept: ENDOCRINOLOGY | Facility: CLINIC | Age: 22
End: 2022-07-11

## 2022-07-11 VITALS
SYSTOLIC BLOOD PRESSURE: 124 MMHG | DIASTOLIC BLOOD PRESSURE: 85 MMHG | WEIGHT: 146 LBS | HEART RATE: 67 BPM | BODY MASS INDEX: 24.32 KG/M2 | HEIGHT: 65 IN

## 2022-07-11 PROCEDURE — 99214 OFFICE O/P EST MOD 30 MIN: CPT

## 2022-07-11 RX ORDER — LEVOTHYROXINE SODIUM 0.2 MG/1
200 TABLET ORAL DAILY
Qty: 30 | Refills: 5 | Status: DISCONTINUED | COMMUNITY
Start: 2020-08-08 | End: 2022-07-11

## 2022-07-13 NOTE — HISTORY OF PRESENT ILLNESS
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 Margins negative for tumor. Level 6 Neck Contents 2/3 Lymph nodes with metastatic papillary CA without extranodal extension.  [de-identified] : 10/11/19  [de-identified] : R lobe contains no nodules. L lobe contains one nodule, Mid to lower pole 3.7 x 2.1 x 2.3 cm solid hypoechoic with microcalcifications. Isthmus with no nodules. [de-identified] : 10/16/20 [de-identified] : s/p total thyroidectomy, no residual tissue in thyroid bed and lymph nodes appears to be normal.  [de-identified] : 03/15/22 [de-identified] : Thyroid: Status post total thyroidectomy. No evidence of pathology within the surgical bed.\par Cervical Lymph Node Evaluation: No abnormal lymph nodes are identified in the neck. A right level 4 benign-appearing lymph node measures 1.9 x 0.4 x 0.8 cm with preserved fatty hilum and normal flow on color Doppler.\par Parathyroid Examination: Parathyroid glands not visualized.\par Impression:\par Status post total thyroidectomy without sonographic evidence of recurrent disease or suspicious adenopathy.

## 2022-07-13 NOTE — PHYSICAL EXAM
[Alert] : alert [Normal Sclera/Conjunctiva] : normal sclera/conjunctiva [Normal Outer Ear/Nose] : the ears and nose were normal in appearance [No Neck Mass] : no neck mass was observed [Thyroid Not Enlarged] : the thyroid was not enlarged [No Thyroid Nodules] : no palpable thyroid nodules [No Respiratory Distress] : no respiratory distress [Clear to Auscultation] : lungs were clear to auscultation bilaterally [Normal Rate] : heart rate was normal [Regular Rhythm] : with a regular rhythm [No Edema] : no peripheral edema [Normal Bowel Sounds] : normal bowel sounds [Spine Straight] : spine straight [No Stigmata of Cushings Syndrome] : no stigmata of Cushings Syndrome [Normal Gait] : normal gait [No Rash] : no rash [Normal Reflexes] : deep tendon reflexes were 2+ and symmetric [Oriented x3] : oriented to person, place, and time [de-identified] : No tenderness.

## 2022-07-13 NOTE — END OF VISIT
[FreeTextEntry3] : All medical record entries made by the Scribe were at my, Dr. Scar Pinto, direction and personally dictated by me on 07/11/2022. I have reviewed the chart and agree that the record accurately reflects my personal performance of the history, physical exam, assessment and plan. I have also personally directed, reviewed and agreed with the chart.

## 2022-07-13 NOTE — DATA REVIEWED
[FreeTextEntry1] : Labs: \par - 10/27/20: s.creat 0.60,  (H), TPO Ab 16.0, Thyroglobulin <0.20 (L)\par - 7/17/20: TSH 7.36, Free T4 1.3, TPO ab neg, Thyroglobulin 6.03, Thyroglobulin ab neg, \par - 6/29/20: TSH 5.43, Free T4 1.7, Free T3 3.15, TPO ab neg, Thyroglobulin 2.01, Thyroglobulin ab neg\par - 5/27/20: , Free T4 0.1, Free T3 0.56, Thyroglobulin 14, Thyroglobulin ab neg\par - 02/03/20: TSH 15.9, Free T4 1.0, TPO ab neg, Thyroglobulin 0.46, Thyroglobulin ab neg\par - 05/22/19: s. creat 0.7, TSH 1.1, total T4 6.4\par \par Imaging:\par - 10/16/20 Thyroid US: Findings: s/p total thyroidectomy, no residual tissue in thyroid bed and lymph nodes appears to be normal. 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 Margins negative for tumor. Level 6 Neck Contents 2/3 Lymph nodes with metastatic papillary CA without extranodal extension. \par - 10/11/19 Thyroid US: R lobe contains no nodules. L lobe contains one nodule, Mid to lower pole 3.7 x 2.1 x 2.3 cm solid hypoechoic with microcalcifications. Isthmus with no nodules.\par - 06/25/19 FNA Biopsy at [facility]: [place] VI, papillary thyroid CA\par - 05/22/19 Thyroid US: L lobe solid heterogeneous nodule at the left mid and lower pole with punctate calcification measuring 3.7 x 1.7 x 2.6cm. Lymph node: L cervical lymph node 1.3 x 1.5 x 1.7 cm. R cervical lymph node 1.8 x 1.3 x 1.0 cm.

## 2022-07-13 NOTE — REVIEW OF SYSTEMS
[As Noted in HPI] : as noted in HPI [Stress] : stress [Negative] : ENT [Dysphagia] : no dysphagia [Dysphonia] : no dysphonia [Palpitations] : no palpitations [Difficulty Breathing] : no dyspnea

## 2022-07-13 NOTE — ADDENDUM
[FreeTextEntry1] : I Fauzia Charli act soley as a scribe for Dr. Scar Pinto on this date. 07/11/2022

## 2023-07-31 ENCOUNTER — NON-APPOINTMENT (OUTPATIENT)
Age: 23
End: 2023-07-31

## 2023-08-03 ENCOUNTER — APPOINTMENT (OUTPATIENT)
Dept: ENDOCRINOLOGY | Facility: CLINIC | Age: 23
End: 2023-08-03
Payer: MEDICAID

## 2023-08-03 ENCOUNTER — LABORATORY RESULT (OUTPATIENT)
Age: 23
End: 2023-08-03

## 2023-08-03 VITALS
SYSTOLIC BLOOD PRESSURE: 122 MMHG | WEIGHT: 158 LBS | HEIGHT: 65 IN | BODY MASS INDEX: 26.33 KG/M2 | HEART RATE: 48 BPM | DIASTOLIC BLOOD PRESSURE: 74 MMHG

## 2023-08-03 LAB
T4 FREE SERPL-MCNC: 0.8 NG/DL
THYROGLOB AB SERPL-ACNC: <20 IU/ML
THYROGLOB SERPL-MCNC: 0.97 NG/ML
TSH SERPL-ACNC: 33 UIU/ML

## 2023-08-03 PROCEDURE — 99214 OFFICE O/P EST MOD 30 MIN: CPT

## 2023-08-03 NOTE — REVIEW OF SYSTEMS
[Stress] : stress [Lethargy] : lethargy [As Noted in HPI] : as noted in HPI [Negative] : Cardiovascular [Dysphagia] : no dysphagia [Difficulty Breathing] : no dyspnea [FreeTextEntry4] : denies voice changes

## 2023-08-03 NOTE — ADDENDUM
[FreeTextEntry1] : I, Karlene Cooper, act soley as a scribe for Dr. Scar Pinto on this date. 08/03/2023

## 2023-08-03 NOTE — PHYSICAL EXAM
[Alert] : alert [Normal Sclera/Conjunctiva] : normal sclera/conjunctiva [Normal Outer Ear/Nose] : the ears and nose were normal in appearance [No Neck Mass] : no neck mass was observed [Thyroid Not Enlarged] : the thyroid was not enlarged [No Thyroid Nodules] : no palpable thyroid nodules [No Respiratory Distress] : no respiratory distress [Clear to Auscultation] : lungs were clear to auscultation bilaterally [Normal Rate] : heart rate was normal [Regular Rhythm] : with a regular rhythm [No Edema] : no peripheral edema [Normal Bowel Sounds] : normal bowel sounds [Spine Straight] : spine straight [No Stigmata of Cushings Syndrome] : no stigmata of Cushings Syndrome [Normal Gait] : normal gait [No Rash] : no rash [Normal Reflexes] : deep tendon reflexes were 2+ and symmetric [Oriented x3] : oriented to person, place, and time [de-identified] : Lymph nodes not enlarged.

## 2023-08-03 NOTE — HISTORY OF PRESENT ILLNESS
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 Margins negative for tumor. Level 6 Neck Contents 2/3 Lymph nodes with metastatic papillary CA without extranodal extension.  [de-identified] : 10/11/19  [de-identified] : R lobe contains no nodules. L lobe contains one nodule, Mid to lower pole 3.7 x 2.1 x 2.3 cm solid hypoechoic with microcalcifications. Isthmus with no nodules. [de-identified] : 10/16/20 [de-identified] : s/p total thyroidectomy, no residual tissue in thyroid bed and lymph nodes appears to be normal.  [de-identified] : 03/15/22 [de-identified] : Thyroid: Status post total thyroidectomy. No evidence of pathology within the surgical bed.\par  Cervical Lymph Node Evaluation: No abnormal lymph nodes are identified in the neck. A right level 4 benign-appearing lymph node measures 1.9 x 0.4 x 0.8 cm with preserved fatty hilum and normal flow on color Doppler.\par  Parathyroid Examination: Parathyroid glands not visualized.\par  Impression:\par  Status post total thyroidectomy without sonographic evidence of recurrent disease or suspicious adenopathy.

## 2023-08-03 NOTE — END OF VISIT
[FreeTextEntry3] : All medical record entries made by the Scribe were at my, Dr. Scar Pinto, direction and personally dictated by me on 08/03/2023. I have reviewed the chart and agree that the record accurately reflects my personal performance of the history, physical exam, assessment and plan. I have also personally directed, reviewed and agreed with the chart.  [Time Spent: ___ minutes] : I have spent [unfilled] minutes of time on the encounter.

## 2023-08-03 NOTE — DATA REVIEWED
[FreeTextEntry1] : Unavailable Labs:  - 06/27/22: Thyroid Peroxidase Ab 14.8, Thyroglobulin <20.0, s.creat 0.7, TSH 0.09, Free T4 3.1, Free T3 3.98 - 10/27/20: s.creat 0.60,  (H), TPO Ab 16.0, Thyroglobulin <0.20 (L) - 7/17/20: TSH 7.36, Free T4 1.3, TPO ab neg, Thyroglobulin 6.03, Thyroglobulin ab neg,  - 6/29/20: TSH 5.43, Free T4 1.7, Free T3 3.15, TPO ab neg, Thyroglobulin 2.01, Thyroglobulin ab neg - 5/27/20: , Free T4 0.1, Free T3 0.56, Thyroglobulin 14, Thyroglobulin ab neg - 02/03/20: TSH 15.9, Free T4 1.0, TPO ab neg, Thyroglobulin 0.46, Thyroglobulin ab neg - 05/22/19: s. creat 0.7, TSH 1.1, total T4 6.4  Imaging: - 10/16/20 Thyroid US: Findings: s/p total thyroidectomy, no residual tissue in thyroid bed and lymph nodes appears to be normal.  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 Margins negative for tumor. Level 6 Neck Contents 2/3 Lymph nodes with metastatic papillary CA without extranodal extension.  - 10/11/19 Thyroid US: R lobe contains no nodules. L lobe contains one nodule, Mid to lower pole 3.7 x 2.1 x 2.3 cm solid hypoechoic with microcalcifications. Isthmus with no nodules. - 06/25/19 FNA Biopsy at [facility]: [place] VI, papillary thyroid CA - 05/22/19 Thyroid US: L lobe solid heterogeneous nodule at the left mid and lower pole with punctate calcification measuring 3.7 x 1.7 x 2.6cm. Lymph node: L cervical lymph node 1.3 x 1.5 x 1.7 cm. R cervical lymph node 1.8 x 1.3 x 1.0 cm.

## 2023-08-31 ENCOUNTER — OUTPATIENT (OUTPATIENT)
Dept: OUTPATIENT SERVICES | Facility: HOSPITAL | Age: 23
LOS: 1 days | End: 2023-08-31
Payer: COMMERCIAL

## 2023-08-31 ENCOUNTER — APPOINTMENT (OUTPATIENT)
Dept: ULTRASOUND IMAGING | Facility: HOSPITAL | Age: 23
End: 2023-08-31
Payer: MEDICAID

## 2023-08-31 PROCEDURE — 76536 US EXAM OF HEAD AND NECK: CPT

## 2023-08-31 PROCEDURE — 76536 US EXAM OF HEAD AND NECK: CPT | Mod: 26

## 2024-04-03 DIAGNOSIS — E89.0 POSTPROCEDURAL HYPOTHYROIDISM: ICD-10-CM

## 2024-04-03 DIAGNOSIS — C73 MALIGNANT NEOPLASM OF THYROID GLAND: ICD-10-CM

## 2024-04-03 RX ORDER — LEVOTHYROXINE SODIUM 0.14 MG/1
137 TABLET ORAL
Qty: 30 | Refills: 3 | Status: ACTIVE | COMMUNITY
Start: 2019-12-06 | End: 1900-01-01

## 2024-07-31 ENCOUNTER — TRANSCRIPTION ENCOUNTER (OUTPATIENT)
Age: 24
End: 2024-07-31

## 2024-08-07 ENCOUNTER — TRANSCRIPTION ENCOUNTER (OUTPATIENT)
Age: 24
End: 2024-08-07

## 2024-10-31 ENCOUNTER — LABORATORY RESULT (OUTPATIENT)
Age: 24
End: 2024-10-31

## 2024-10-31 DIAGNOSIS — E89.0 POSTPROCEDURAL HYPOTHYROIDISM: ICD-10-CM

## 2024-11-02 LAB
T4 FREE SERPL-MCNC: 1.8 NG/DL
THYROGLOB AB SERPL-ACNC: 15.8 IU/ML
THYROGLOB SERPL-MCNC: 0.22 NG/ML
TSH SERPL-ACNC: 0.05 UIU/ML

## 2024-11-14 ENCOUNTER — APPOINTMENT (OUTPATIENT)
Dept: ENDOCRINOLOGY | Facility: CLINIC | Age: 24
End: 2024-11-14

## 2025-01-23 ENCOUNTER — TRANSCRIPTION ENCOUNTER (OUTPATIENT)
Age: 25
End: 2025-01-23

## 2025-01-23 DIAGNOSIS — C73 MALIGNANT NEOPLASM OF THYROID GLAND: ICD-10-CM

## 2025-02-03 ENCOUNTER — NON-APPOINTMENT (OUTPATIENT)
Age: 25
End: 2025-02-03

## 2025-02-04 ENCOUNTER — APPOINTMENT (OUTPATIENT)
Dept: ENDOCRINOLOGY | Facility: CLINIC | Age: 25
End: 2025-02-04

## 2025-02-25 ENCOUNTER — TRANSCRIPTION ENCOUNTER (OUTPATIENT)
Age: 25
End: 2025-02-25

## 2025-02-26 ENCOUNTER — RX RENEWAL (OUTPATIENT)
Age: 25
End: 2025-02-26

## 2025-02-27 ENCOUNTER — LABORATORY RESULT (OUTPATIENT)
Age: 25
End: 2025-02-27

## 2025-02-27 ENCOUNTER — APPOINTMENT (OUTPATIENT)
Dept: ENDOCRINOLOGY | Facility: CLINIC | Age: 25
End: 2025-02-27
Payer: COMMERCIAL

## 2025-02-27 VITALS — HEART RATE: 80 BPM | SYSTOLIC BLOOD PRESSURE: 113 MMHG | DIASTOLIC BLOOD PRESSURE: 68 MMHG | WEIGHT: 162 LBS

## 2025-02-27 DIAGNOSIS — C73 MALIGNANT NEOPLASM OF THYROID GLAND: ICD-10-CM

## 2025-02-27 DIAGNOSIS — Z90.89 OTHER SPECIFIED POSTPROCEDURAL STATES: ICD-10-CM

## 2025-02-27 DIAGNOSIS — Z98.890 OTHER SPECIFIED POSTPROCEDURAL STATES: ICD-10-CM

## 2025-02-27 PROCEDURE — 99215 OFFICE O/P EST HI 40 MIN: CPT

## 2025-02-27 PROCEDURE — G2211 COMPLEX E/M VISIT ADD ON: CPT

## 2025-02-28 PROBLEM — Z98.890 S/P TOTAL THYROIDECTOMY: Status: ACTIVE | Noted: 2019-11-07

## 2025-03-03 LAB
ALBUMIN SERPL ELPH-MCNC: 4.1 G/DL
ALP BLD-CCNC: 53 U/L
ALT SERPL-CCNC: 8 U/L
ANION GAP SERPL CALC-SCNC: 13 MMOL/L
AST SERPL-CCNC: 18 U/L
BILIRUB SERPL-MCNC: 0.4 MG/DL
BUN SERPL-MCNC: 13 MG/DL
CALCIUM SERPL-MCNC: 8.6 MG/DL
CHLORIDE SERPL-SCNC: 108 MMOL/L
CO2 SERPL-SCNC: 24 MMOL/L
CREAT SERPL-MCNC: 0.83 MG/DL
EGFR: 100 ML/MIN/1.73M2
GLUCOSE SERPL-MCNC: 83 MG/DL
POTASSIUM SERPL-SCNC: 4.3 MMOL/L
PROT SERPL-MCNC: 6.7 G/DL
SODIUM SERPL-SCNC: 144 MMOL/L
T4 FREE SERPL-MCNC: <0.4 NG/DL
THYROGLOB AB SERPL-ACNC: 16.1 IU/ML
THYROGLOB SERPL-MCNC: 1.74 NG/ML
TSH SERPL-ACNC: 37.4 UIU/ML

## 2025-03-06 ENCOUNTER — OUTPATIENT (OUTPATIENT)
Dept: OUTPATIENT SERVICES | Facility: HOSPITAL | Age: 25
LOS: 1 days | End: 2025-03-06

## 2025-03-06 ENCOUNTER — APPOINTMENT (OUTPATIENT)
Dept: ULTRASOUND IMAGING | Facility: CLINIC | Age: 25
End: 2025-03-06
Payer: COMMERCIAL

## 2025-03-06 PROCEDURE — 76536 US EXAM OF HEAD AND NECK: CPT | Mod: 26

## 2025-03-10 ENCOUNTER — RX RENEWAL (OUTPATIENT)
Age: 25
End: 2025-03-10

## 2025-05-08 NOTE — ASU PATIENT PROFILE, ADULT - FALL HARM RISK TYPE OF ASSESSMENT
Lvm to schedule   
Medication: rizatriptan passed protocol.   Last office visit date: 11/1/2024  Next appointment scheduled?: No;  pt due for physical with PCP around 11/1/2025. Please help schedule, medication has been filled.     Number of refills given: 1  
Pt called back and scheduled her cpx for 11/14/25.  
Admission

## 2025-08-11 ENCOUNTER — LABORATORY RESULT (OUTPATIENT)
Age: 25
End: 2025-08-11

## 2025-08-11 ENCOUNTER — APPOINTMENT (OUTPATIENT)
Dept: ENDOCRINOLOGY | Facility: CLINIC | Age: 25
End: 2025-08-11
Payer: COMMERCIAL

## 2025-08-11 VITALS
HEART RATE: 57 BPM | BODY MASS INDEX: 29.32 KG/M2 | WEIGHT: 176 LBS | SYSTOLIC BLOOD PRESSURE: 98 MMHG | HEIGHT: 65 IN | DIASTOLIC BLOOD PRESSURE: 61 MMHG

## 2025-08-11 DIAGNOSIS — C73 MALIGNANT NEOPLASM OF THYROID GLAND: ICD-10-CM

## 2025-08-11 PROCEDURE — 99215 OFFICE O/P EST HI 40 MIN: CPT

## 2025-08-12 LAB
T4 FREE SERPL-MCNC: 0.8 NG/DL
THYROGLOB AB SERPL-ACNC: 19.8 IU/ML
THYROGLOB SERPL-MCNC: 0.29 NG/ML
TSH SERPL-ACNC: 6.39 UIU/ML